# Patient Record
Sex: MALE | Race: OTHER | HISPANIC OR LATINO | Employment: UNEMPLOYED | URBAN - METROPOLITAN AREA
[De-identification: names, ages, dates, MRNs, and addresses within clinical notes are randomized per-mention and may not be internally consistent; named-entity substitution may affect disease eponyms.]

---

## 2020-12-27 ENCOUNTER — APPOINTMENT (EMERGENCY)
Dept: RADIOLOGY | Facility: HOSPITAL | Age: 30
End: 2020-12-27
Payer: COMMERCIAL

## 2020-12-27 ENCOUNTER — HOSPITAL ENCOUNTER (EMERGENCY)
Facility: HOSPITAL | Age: 30
Discharge: ANOTHER HOSPITAL | End: 2020-12-27
Attending: EMERGENCY MEDICINE
Payer: COMMERCIAL

## 2020-12-27 VITALS
HEART RATE: 108 BPM | TEMPERATURE: 97 F | SYSTOLIC BLOOD PRESSURE: 142 MMHG | OXYGEN SATURATION: 100 % | DIASTOLIC BLOOD PRESSURE: 80 MMHG | WEIGHT: 209.22 LBS | RESPIRATION RATE: 18 BRPM

## 2020-12-27 DIAGNOSIS — S48.922A: Primary | ICD-10-CM

## 2020-12-27 LAB
ABO GROUP BLD: NORMAL
ANION GAP SERPL CALCULATED.3IONS-SCNC: 13 MMOL/L (ref 4–13)
APTT PPP: 24 SECONDS (ref 23–37)
BASOPHILS # BLD AUTO: 0.03 THOUSANDS/ΜL (ref 0–0.1)
BASOPHILS NFR BLD AUTO: 0 % (ref 0–1)
BLD GP AB SCN SERPL QL: NEGATIVE
BUN SERPL-MCNC: 15 MG/DL (ref 5–25)
CALCIUM SERPL-MCNC: 8.7 MG/DL (ref 8.3–10.1)
CHLORIDE SERPL-SCNC: 105 MMOL/L (ref 100–108)
CO2 SERPL-SCNC: 23 MMOL/L (ref 21–32)
CREAT SERPL-MCNC: 1.41 MG/DL (ref 0.6–1.3)
EOSINOPHIL # BLD AUTO: 0.18 THOUSAND/ΜL (ref 0–0.61)
EOSINOPHIL NFR BLD AUTO: 2 % (ref 0–6)
ERYTHROCYTE [DISTWIDTH] IN BLOOD BY AUTOMATED COUNT: 11.7 % (ref 11.6–15.1)
GFR SERPL CREATININE-BSD FRML MDRD: 67 ML/MIN/1.73SQ M
GLUCOSE SERPL-MCNC: 118 MG/DL (ref 65–140)
HCT VFR BLD AUTO: 49.1 % (ref 36.5–49.3)
HGB BLD-MCNC: 16.7 G/DL (ref 12–17)
IMM GRANULOCYTES # BLD AUTO: 0.04 THOUSAND/UL (ref 0–0.2)
IMM GRANULOCYTES NFR BLD AUTO: 1 % (ref 0–2)
INR PPP: 0.93 (ref 0.84–1.19)
LYMPHOCYTES # BLD AUTO: 3.29 THOUSANDS/ΜL (ref 0.6–4.47)
LYMPHOCYTES NFR BLD AUTO: 39 % (ref 14–44)
MCH RBC QN AUTO: 31.9 PG (ref 26.8–34.3)
MCHC RBC AUTO-ENTMCNC: 34 G/DL (ref 31.4–37.4)
MCV RBC AUTO: 94 FL (ref 82–98)
MONOCYTES # BLD AUTO: 0.81 THOUSAND/ΜL (ref 0.17–1.22)
MONOCYTES NFR BLD AUTO: 10 % (ref 4–12)
NEUTROPHILS # BLD AUTO: 3.99 THOUSANDS/ΜL (ref 1.85–7.62)
NEUTS SEG NFR BLD AUTO: 48 % (ref 43–75)
NRBC BLD AUTO-RTO: 0 /100 WBCS
PLATELET # BLD AUTO: 238 THOUSANDS/UL (ref 149–390)
PMV BLD AUTO: 10.8 FL (ref 8.9–12.7)
POTASSIUM SERPL-SCNC: 2.8 MMOL/L (ref 3.5–5.3)
PROTHROMBIN TIME: 12.3 SECONDS (ref 11.6–14.5)
RBC # BLD AUTO: 5.24 MILLION/UL (ref 3.88–5.62)
RH BLD: POSITIVE
SODIUM SERPL-SCNC: 141 MMOL/L (ref 136–145)
SPECIMEN EXPIRATION DATE: NORMAL
WBC # BLD AUTO: 8.34 THOUSAND/UL (ref 4.31–10.16)

## 2020-12-27 PROCEDURE — 99291 CRITICAL CARE FIRST HOUR: CPT | Performed by: EMERGENCY MEDICINE

## 2020-12-27 PROCEDURE — 85610 PROTHROMBIN TIME: CPT | Performed by: EMERGENCY MEDICINE

## 2020-12-27 PROCEDURE — 86850 RBC ANTIBODY SCREEN: CPT | Performed by: EMERGENCY MEDICINE

## 2020-12-27 PROCEDURE — 36415 COLL VENOUS BLD VENIPUNCTURE: CPT | Performed by: EMERGENCY MEDICINE

## 2020-12-27 PROCEDURE — 73110 X-RAY EXAM OF WRIST: CPT

## 2020-12-27 PROCEDURE — 71045 X-RAY EXAM CHEST 1 VIEW: CPT

## 2020-12-27 PROCEDURE — 85730 THROMBOPLASTIN TIME PARTIAL: CPT | Performed by: EMERGENCY MEDICINE

## 2020-12-27 PROCEDURE — 80048 BASIC METABOLIC PNL TOTAL CA: CPT | Performed by: EMERGENCY MEDICINE

## 2020-12-27 PROCEDURE — 96360 HYDRATION IV INFUSION INIT: CPT

## 2020-12-27 PROCEDURE — 90471 IMMUNIZATION ADMIN: CPT

## 2020-12-27 PROCEDURE — 86900 BLOOD TYPING SEROLOGIC ABO: CPT | Performed by: EMERGENCY MEDICINE

## 2020-12-27 PROCEDURE — 90715 TDAP VACCINE 7 YRS/> IM: CPT | Performed by: EMERGENCY MEDICINE

## 2020-12-27 PROCEDURE — 99291 CRITICAL CARE FIRST HOUR: CPT

## 2020-12-27 PROCEDURE — 86901 BLOOD TYPING SEROLOGIC RH(D): CPT | Performed by: EMERGENCY MEDICINE

## 2020-12-27 PROCEDURE — 85025 COMPLETE CBC W/AUTO DIFF WBC: CPT | Performed by: EMERGENCY MEDICINE

## 2020-12-27 RX ADMIN — TETANUS TOXOID, REDUCED DIPHTHERIA TOXOID AND ACELLULAR PERTUSSIS VACCINE, ADSORBED 0.5 ML: 5; 2.5; 8; 8; 2.5 SUSPENSION INTRAMUSCULAR at 22:43

## 2020-12-27 RX ADMIN — SODIUM CHLORIDE 1000 ML: 0.9 INJECTION, SOLUTION INTRAVENOUS at 22:17

## 2021-02-22 ENCOUNTER — TRANSCRIBE ORDERS (OUTPATIENT)
Dept: PHYSICAL THERAPY | Facility: MEDICAL CENTER | Age: 31
End: 2021-02-22

## 2021-02-22 ENCOUNTER — EVALUATION (OUTPATIENT)
Dept: PHYSICAL THERAPY | Facility: MEDICAL CENTER | Age: 31
End: 2021-02-22
Payer: COMMERCIAL

## 2021-02-22 DIAGNOSIS — S61.512D LACERATION OF LEFT WRIST, SUBSEQUENT ENCOUNTER: ICD-10-CM

## 2021-02-22 DIAGNOSIS — Z47.89 AFTERCARE FOLLOWING SURGERY OF THE MUSCULOSKELETAL SYSTEM, NEC: Primary | ICD-10-CM

## 2021-02-22 PROCEDURE — 97161 PT EVAL LOW COMPLEX 20 MIN: CPT
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
## 2021-02-25 ENCOUNTER — OFFICE VISIT (OUTPATIENT)
Dept: PHYSICAL THERAPY | Facility: MEDICAL CENTER | Age: 31
End: 2021-02-25
Payer: COMMERCIAL

## 2021-02-25 DIAGNOSIS — S61.512D LACERATION OF LEFT WRIST, SUBSEQUENT ENCOUNTER: ICD-10-CM

## 2021-02-25 DIAGNOSIS — Z47.89 AFTERCARE FOLLOWING SURGERY OF THE MUSCULOSKELETAL SYSTEM, NEC: Primary | ICD-10-CM

## 2021-02-25 PROCEDURE — 97140 MANUAL THERAPY 1/> REGIONS: CPT

## 2021-02-25 NOTE — PROGRESS NOTES
Daily Note     Today's date: 2021  Patient name: Edwin Mejias  : 1990  MRN: [id number]  Referring provider: Marietta Jurado MD  Dx:   Encounter Diagnosis     ICD-10-CM    1  Aftercare following surgery of the musculoskeletal system, NEC  Z47 89    2  Laceration of left wrist, subsequent encounter  S66 112D                   Subjective: Pt reports he was able to go through his exercise program without issues  See Dr Jennifer Jules  Objective: See treatment diary below      Assessment: Tolerated treatment well  Patient exhibited good technique with therapeutic exercises and would benefit from continued PT   Initiated program, pt unable to perform gentle grasping with towel and cones  Able to move digits some with tissue bunching  Reviewed tenodesis and place-hold together  Pt able to actively perform some tendon gliding moving out of resting flexion into slight extension ( in tenodesis); only able to hold flexion contraction with place- hold; Poor differential gliding among FDP/FDS, isolated contraction is fair  Continue to work on activation of flexors as well as differential gliding with blocking exercises  Plan: Continue per plan of care        Precautions: DOS 20  L FPL, FDS/FDP (zone 4), ECU, med/ulanr nerve allograft, CTR and guyon release  HEP: scar massage, wrist PROM (digits relaxed), wrist tenodesis, gentle long flexor stretch individually, blocking, thumb opp    Manuals             Scar/retro massage 5            AA digits, thumb 15            PROM wrist ( digits protected) 10            Place-hold 5            Neuro Re-Ed 35            Wrist widget                                                                                           Ther Ex             Towel bunches Tissue 2 min             wrist ball roll 3 min            cones grasping             Pegs grasping                                                                 Ther Activity Gait Training                                       Modalities              10            CP

## 2021-03-02 ENCOUNTER — OFFICE VISIT (OUTPATIENT)
Dept: PHYSICAL THERAPY | Facility: MEDICAL CENTER | Age: 31
End: 2021-03-02
Payer: COMMERCIAL

## 2021-03-02 DIAGNOSIS — S61.512D LACERATION OF LEFT WRIST, SUBSEQUENT ENCOUNTER: ICD-10-CM

## 2021-03-02 DIAGNOSIS — Z47.89 AFTERCARE FOLLOWING SURGERY OF THE MUSCULOSKELETAL SYSTEM, NEC: Primary | ICD-10-CM

## 2021-03-02 PROCEDURE — 97140 MANUAL THERAPY 1/> REGIONS: CPT

## 2021-03-02 NOTE — PROGRESS NOTES
Daily Note     Today's date: 3/2/2021  Patient name: Taty Alfonso  : 1990  MRN: [id number]  Referring provider: Janae Grullon MD  Dx:   Encounter Diagnosis     ICD-10-CM    1  Aftercare following surgery of the musculoskeletal system, NEC  Z47 89    2  Laceration of left wrist, subsequent encounter  S64 990D                   Subjective: Pt reports he has better motion with massage to his scar and his hand  Still having trouble with active motion especially in his thumb flexion      Objective: See treatment diary below      Assessment: Tolerated treatment well  Patient exhibited good technique with therapeutic exercises and would benefit from continued PT  Pt has better performance with place-hold vs  Active comp flexion or extension of digits  Advised pt to try place-holds on his own for comp flexion and table top; continue with blocking exercises  Plan: Continue per plan of care        Precautions: DOS 20  L FPL, FDS/FDP (zone 4), ECU, med/ulanr nerve allograft, CTR and guyon release  HEP: scar massage, wrist PROM (digits relaxed), wrist tenodesis, gentle long flexor stretch individually, blocking, thumb opp    Manuals 2/25 3/2           Scar/retro massage 5 5           AA digits, thumb 15 15           PROM wrist ( digits protected) 10 5           Place-hold 5 5           Neuro Re-Ed 35 30           Wrist widget                                                                                           Ther Ex             Towel bunches Tissue 2 min             wrist ball roll 3 min            cones grasping             Pegs grasping                                                                 Ther Activity                                       Gait Training                                       Modalities              10 10           CP

## 2021-03-04 ENCOUNTER — OFFICE VISIT (OUTPATIENT)
Dept: PHYSICAL THERAPY | Facility: MEDICAL CENTER | Age: 31
End: 2021-03-04
Payer: COMMERCIAL

## 2021-03-04 DIAGNOSIS — Z47.89 AFTERCARE FOLLOWING SURGERY OF THE MUSCULOSKELETAL SYSTEM, NEC: Primary | ICD-10-CM

## 2021-03-04 DIAGNOSIS — S61.512D LACERATION OF LEFT WRIST, SUBSEQUENT ENCOUNTER: ICD-10-CM

## 2021-03-04 PROCEDURE — 97140 MANUAL THERAPY 1/> REGIONS: CPT

## 2021-03-04 NOTE — PROGRESS NOTES
Daily Note     Today's date: 3/4/2021  Patient name: Kylee Garcia  : 1990  MRN: [id number]  Referring provider: Angie Jones MD  Dx:   Encounter Diagnosis     ICD-10-CM    1  Aftercare following surgery of the musculoskeletal system, NEC  Z47 89    2  Laceration of left wrist, subsequent encounter  S61 677D                   Subjective: Pt feels he is able to bend his fingers a little bit more but still very limited  Objective: See treatment diary below      Assessment: Tolerated treatment well  Patient exhibited good technique with therapeutic exercises and would benefit from continued PT  Added NMES to program, pt had some discomfort but slight improvement in his digit flexion  Also added foams grasping and stacking for home to work on active flexion/grasping, pt able to demonstrate    Plan: Continue per plan of care        Precautions: DOS 20  L FPL, FDS/FDP (zone 4), ECU, med/ulanr nerve allograft, CTR and guyon release  HEP: scar massage, wrist PROM (digits relaxed), wrist tenodesis, gentle long flexor stretch individually, blocking, thumb opp    Manuals  3/2 3/4          Scar/retro massage 5 5 5          AA digits, thumb 15 15 10          PROM wrist ( digits protected) 10 5 5          Place-hold 5 5 5          Neuro Re-Ed 35 30 25          Wrist widget                                                                                           Ther Ex             Towel bunches Tissue 2 min             wrist ball roll 3 min            cones grasping             Pegs grasping                                                                 Ther Activity                                       Gait Training                                       Modalities             MH 10 10 10             NMES 10          CP

## 2021-03-09 ENCOUNTER — APPOINTMENT (OUTPATIENT)
Dept: PHYSICAL THERAPY | Facility: MEDICAL CENTER | Age: 31
End: 2021-03-09
Payer: COMMERCIAL

## 2021-03-09 ENCOUNTER — OFFICE VISIT (OUTPATIENT)
Dept: PHYSICAL THERAPY | Facility: MEDICAL CENTER | Age: 31
End: 2021-03-09
Payer: COMMERCIAL

## 2021-03-09 DIAGNOSIS — Z47.89 AFTERCARE FOLLOWING SURGERY OF THE MUSCULOSKELETAL SYSTEM, NEC: Primary | ICD-10-CM

## 2021-03-09 DIAGNOSIS — S61.512D LACERATION OF LEFT WRIST, SUBSEQUENT ENCOUNTER: ICD-10-CM

## 2021-03-09 PROCEDURE — 97140 MANUAL THERAPY 1/> REGIONS: CPT

## 2021-03-09 NOTE — PROGRESS NOTES
Daily Note     Today's date: 3/9/2021  Patient name: Kylee Garcia  : 1990  MRN: [id number]  Referring provider: Angie Jones MD  Dx:   Encounter Diagnosis     ICD-10-CM    1  Aftercare following surgery of the musculoskeletal system, NEC  Z47 89    2  Laceration of left wrist, subsequent encounter  S69 760D                   Subjective: Pt reports he still is struggling with his active motion of digits  No pain with stretching  Scar still sensitive      Objective: See treatment diary below      Assessment: Tolerated treatment well  Patient exhibited good technique with therapeutic exercises and would benefit from continued PT  Minimal glide with NMES  Improved wrist extension to about 45-50 degrees yet no change in composite flexion of digit even with place-hold  Improved extension to radial digits but ulnar digits are still significantly tight, possibly from scar  Still able to isolate FDP for each digit but weak  Provided Pt with elastomer for scar to wear at night  Continue to work on passive ROM and active flex/extension of digits    Plan: Continue per plan of care        Precautions: DOS 20  L FPL, FDS/FDP (zone 4), ECU, med/ulnar nerve allograft, CTR and guyon release  HEP: scar massage, wrist PROM (digits relaxed), wrist tenodesis, gentle long flexor stretch individually, blocking, thumb opp    Manuals 2/25 3/2 3/4 3/9         Scar/retro massage 5 5 5 5         AA digits, thumb 15 15 10 10         PROM wrist ( digits protected) 10 5 5 5         Place-hold 5 5 5 elastomer 5         Neuro Re-Ed 35 30 25 25         Wrist widget                                                                                           Ther Ex             Towel bunches Tissue 2 min             wrist ball roll 3 min            cones grasping             Pegs grasping                                                                 Ther Activity                                       Gait Training Modalities              10 10 10 10            NMES 10 NMES 10         CP

## 2021-03-11 ENCOUNTER — OFFICE VISIT (OUTPATIENT)
Dept: PHYSICAL THERAPY | Facility: MEDICAL CENTER | Age: 31
End: 2021-03-11
Payer: COMMERCIAL

## 2021-03-11 DIAGNOSIS — Z47.89 AFTERCARE FOLLOWING SURGERY OF THE MUSCULOSKELETAL SYSTEM, NEC: Primary | ICD-10-CM

## 2021-03-11 DIAGNOSIS — S61.512D LACERATION OF LEFT WRIST, SUBSEQUENT ENCOUNTER: ICD-10-CM

## 2021-03-11 PROCEDURE — 97140 MANUAL THERAPY 1/> REGIONS: CPT

## 2021-03-11 NOTE — PROGRESS NOTES
Daily Note     Today's date: 3/11/2021  Patient name: Da Ivey  : 1990  MRN: [id number]  Referring provider: Javid Mai MD  Dx:   Encounter Diagnosis     ICD-10-CM    1  Aftercare following surgery of the musculoskeletal system, NEC  Z47 89    2  Laceration of left wrist, subsequent encounter  S61 087D                   Subjective: Pt reports no changes in his movement      Objective: See treatment diary below      Assessment: Tolerated treatment well  Patient exhibited good technique with therapeutic exercises and would benefit from continued PT   Discussed with pt on fabricating custom static progressive splint to achieve extension- pt could wear for 1 hour 4x/day- awaiting permission from surgeon  Worked on extensors, ulnar digits still most limited  Still able to isolate FDP in each digit but weak  Plan: Continue per plan of care        Precautions: DOS 20  L FPL, FDS/FDP (zone 4), ECU, med/ulnar nerve allograft, CTR and guyon release  HEP: scar massage, wrist PROM (digits relaxed), wrist tenodesis, gentle long flexor stretch individually, blocking, thumb opp    Manuals  3 3/4 3/9 3/11        Scar/retro massage 5 5 5 5 5        AA digits, thumb 15 15 10 10 15        PROM wrist ( digits protected) 10 5 5 5 5        Place-hold 5 5 5 elastomer 5 10        Neuro Re-Ed 35 30 25 25 45        Wrist widget                                                                                           Ther Ex             Towel bunches Tissue 2 min             wrist ball roll 3 min            cones grasping             Pegs grasping                                                                 Ther Activity                                       Gait Training                                       Modalities             MH 10 10 10 10            NMES 10 NMES 10         CP

## 2021-03-16 ENCOUNTER — OFFICE VISIT (OUTPATIENT)
Dept: PHYSICAL THERAPY | Facility: MEDICAL CENTER | Age: 31
End: 2021-03-16
Payer: COMMERCIAL

## 2021-03-16 DIAGNOSIS — Z47.89 AFTERCARE FOLLOWING SURGERY OF THE MUSCULOSKELETAL SYSTEM, NEC: Primary | ICD-10-CM

## 2021-03-16 DIAGNOSIS — S61.512D LACERATION OF LEFT WRIST, SUBSEQUENT ENCOUNTER: ICD-10-CM

## 2021-03-16 PROCEDURE — 97140 MANUAL THERAPY 1/> REGIONS: CPT

## 2021-03-16 NOTE — PROGRESS NOTES
Daily Note     Today's date: 3/16/2021  Patient name: Meg Barksdale  : 1990  MRN: [id number]  Referring provider: Mandy Glover MD  Dx:   Encounter Diagnosis     ICD-10-CM    1  Aftercare following surgery of the musculoskeletal system, NEC  Z47 89    2  Laceration of left wrist, subsequent encounter  S63 013D                   Subjective: pt reports he has been working on his hand regualrly but still not change in his active movement  Objective: See treatment diary below      Assessment: Tolerated treatment well  Patient exhibited good technique with therapeutic exercises and would benefit from continued PT   Still have not heard back from Dr Ekaterina Malone or his assistant if static progressive would be approved ( L/M on Wednesday 3/10 and emailed Dr Ekaterina Malone on Thursday 3/11)  Pt independently performing gentle stretching on his own  Pt has improved mobility in his digits when moving into extension; no significant change in wrist position, most likely tension from scar  Pt noted pain into his palm  Added US to scar and performed IASTM with gentle stretch- pt still sensitive along median of scar  No pain with gentle stretch to flexors  Place- holds still unsuccessful yet improved independent glide and differential glide to FDP and FDS  Pt also has improved active extension moving into place-hold table top and gradual extension to MCP  Fabricated custom extension splint with MPs in 30 degrees, wrist neutral, pt able to don/doff  Provided pt with relative motion splint to wear during the day- RF and SF in relative flexion- pt was able to grasp and hold cones, after about 10 cones, pt's digits fatigued  Pt seemed pleased he could use his hand for gentle grasping in general  Instructed him on wearing relative motion during the day  Plan: Continue per plan of care        Precautions: DOS 20  L FPL, FDS/FDP (zone 4), ECU, med/ulnar nerve allograft, CTR and guyon release  HEP: scar massage, wrist PROM (digits relaxed), wrist tenodesis, gentle long flexor stretch individually, blocking, thumb opp    Manuals 2/25 3/2 3/4 3/9 3/11 3/16       Scar/retro massage 5 5 5 5 5 10       AA digits, thumb 15 15 10 10 15 15       PROM wrist ( digits protected) 10 5 5 5 5        Place-hold 5 5 5 elastomer 5 10 relative motion       Neuro Re-Ed 35 30 25 25 45 50       Wrist widget                                                                                           Ther Ex             Towel bunches Tissue 2 min             wrist ball roll 3 min            cones grasping             Pegs grasping                                                                 Ther Activity                                       Gait Training                                       Modalities             MH 10 10 10 10  10          NMES 10 NMES 10         CP

## 2021-03-18 ENCOUNTER — OFFICE VISIT (OUTPATIENT)
Dept: PHYSICAL THERAPY | Facility: MEDICAL CENTER | Age: 31
End: 2021-03-18
Payer: COMMERCIAL

## 2021-03-18 DIAGNOSIS — Z47.89 AFTERCARE FOLLOWING SURGERY OF THE MUSCULOSKELETAL SYSTEM, NEC: Primary | ICD-10-CM

## 2021-03-18 DIAGNOSIS — S61.512D LACERATION OF LEFT WRIST, SUBSEQUENT ENCOUNTER: ICD-10-CM

## 2021-03-18 PROCEDURE — 97140 MANUAL THERAPY 1/> REGIONS: CPT

## 2021-03-18 NOTE — PROGRESS NOTES
Daily Note     Today's date: 3/18/2021  Patient name: Edwin Mejias  : 1990  MRN: [id number]  Referring provider: Marietta Jurado MD  Dx:   Encounter Diagnosis     ICD-10-CM    1  Aftercare following surgery of the musculoskeletal system, NEC  Z47 89    2  Laceration of left wrist, subsequent encounter  S68 765D                   Subjective: Pt reports he likes the relative motion splint, wearing it all day  Feels like the tendon are moving better expect small finger      Objective: See treatment diary below      Assessment: Tolerated treatment well  Patient exhibited good technique with therapeutic exercises and would benefit from continued PT   Increased time on US - 50% with incorporated stretch for flexors  Fabricated exercise splint for blocking, pt instructed on use for each  Pt able to grasp cones without relative motion splint for 3 minutes, less fatigue      Plan: Continue per plan of care        Precautions: DOS 20  L FPL, FDS/FDP (zone 4), ECU, med/ulnar nerve allograft, CTR and guyon release  HEP: scar massage, wrist PROM (digits relaxed), wrist tenodesis, gentle long flexor stretch individually, blocking, thumb opp    Manuals 2/25 3/2 3/4 3/9 3/11 3/16 3/18      Scar/retro massage 5 5 5 5 5 10 10      AA digits, thumb 15 15 10 10 15 15 10      PROM wrist ( digits protected) 10 5 5 5 5        Place-hold 5 5 5 elastomer 5 10 relative motion 10 US with stretch      Neuro Re-Ed 35 30 25 25 45 50 30      Wrist widget                                                                                           Ther Ex             Towel bunches Tissue 2 min             wrist ball roll 3 min            cones grasping       3 min      Pegs grasping                                                                 Ther Activity                                       Gait Training                                       Modalities             MH 10 10 10 10  10 10         NMES 10 NMES 10  US 5       CP

## 2021-03-23 ENCOUNTER — OFFICE VISIT (OUTPATIENT)
Dept: PHYSICAL THERAPY | Facility: MEDICAL CENTER | Age: 31
End: 2021-03-23
Payer: COMMERCIAL

## 2021-03-23 DIAGNOSIS — Z47.89 AFTERCARE FOLLOWING SURGERY OF THE MUSCULOSKELETAL SYSTEM, NEC: Primary | ICD-10-CM

## 2021-03-23 DIAGNOSIS — S61.512D LACERATION OF LEFT WRIST, SUBSEQUENT ENCOUNTER: ICD-10-CM

## 2021-03-23 PROCEDURE — 97140 MANUAL THERAPY 1/> REGIONS: CPT

## 2021-03-23 NOTE — PROGRESS NOTES
Daily Note     Today's date: 3/23/2021  Patient name: Sandoval Pacheco  : 1990  MRN: [id number]  Referring provider: Elio Mallory MD  Dx:   Encounter Diagnosis     ICD-10-CM    1  Aftercare following surgery of the musculoskeletal system, NEC  Z47 89    2  Laceration of left wrist, subsequent encounter  S62 364D                   Subjective: Pt reports he feels the scar is really holding him back he is working on hard on bending his fingers      Objective: See treatment diary below      Assessment: Tolerated treatment well  Patient exhibited good technique with therapeutic exercises and would benefit from continued PT  Pt had improved terminal extension to all digits with MCP's held in about 20 degrees flexion, once they collapse into hyperextension only a hook fist can be formed  His IF was lagging in comp flexion place hold  and active grasping activities  When IF was isolated he had good independent FDP and FDS glide yet only fair differential glide  He was unable to form full comp flexion of his IF even with assistance  Pt described himself working hard to get tendons to move but he felt scar was main limitation to his lack of motion  Plan: Continue per plan of care        Precautions: DOS 20  L FPL, FDS/FDP (zone 4), ECU, med/ulnar nerve allograft, CTR and guyon release  HEP: scar massage, wrist PROM (digits relaxed), wrist tenodesis, gentle long flexor stretch individually, blocking, thumb opp    Manuals  3 3/4 3/9 3/11 3/16 3/18 3/23     Scar/retro massage 5 5 5 5 5 10 10 10     AA digits, thumb 15 15 10 10 15 15 10 10     PROM wrist ( digits protected) 10 5 5 5 5        Place-hold 5 5 5 elastomer 5 10 relative motion 10 US with stretch 10     Neuro Re-Ed 35 30 25 25 45 50 30 30     Wrist widget                                                                                           Ther Ex             Towel bunches Tissue 2 min       3 min      wrist ball roll 3 min cones grasping       3 min 3 min     Pegs grasping                                                                 Ther Activity                                       Gait Training                                       Modalities             MH 10 10 10 10  10 10 10        NMES 10 NMES 10  US 5       CP

## 2021-03-25 ENCOUNTER — OFFICE VISIT (OUTPATIENT)
Dept: PHYSICAL THERAPY | Facility: MEDICAL CENTER | Age: 31
End: 2021-03-25
Payer: COMMERCIAL

## 2021-03-25 DIAGNOSIS — S61.512D LACERATION OF LEFT WRIST, SUBSEQUENT ENCOUNTER: ICD-10-CM

## 2021-03-25 DIAGNOSIS — Z47.89 AFTERCARE FOLLOWING SURGERY OF THE MUSCULOSKELETAL SYSTEM, NEC: Primary | ICD-10-CM

## 2021-03-25 PROCEDURE — 97140 MANUAL THERAPY 1/> REGIONS: CPT

## 2021-03-25 NOTE — PROGRESS NOTES
Daily Note     Today's date: 3/25/2021  Patient name: Da Ivey  : 1990  MRN: [id number]  Referring provider: Javid Mai MD  Dx:   Encounter Diagnosis     ICD-10-CM    1  Aftercare following surgery of the musculoskeletal system, NEC  Z47 89    2  Laceration of left wrist, subsequent encounter  S68 316D                   Subjective: Pt reports a little bit of pain trying to actively move digits after aggressive IASTM      Objective: See treatment diary below      Assessment: Tolerated treatment well  Patient exhibited good technique with therapeutic exercises and would benefit from continued PT  Performed US and aggressive IASTM to scar  Pt still has extension lag to his digits with greater extension than 20 at his MCPs'  Flexion lag at his IF  After manuals and TE's, able to perform about about 45-50 degrees flexion at his MCP's ni composite flexion which he could not do previous week  Encouraged pt to continue to work on his flexion, extension, and blocking for even better glide of his FDP and FDS    Plan: Continue per plan of care        Precautions: DOS 20  L FPL, FDS/FDP (zone 4), ECU, med/ulnar nerve allograft, CTR and guyon release  HEP: scar massage, wrist PROM (digits relaxed), wrist tenodesis, gentle long flexor stretch individually, blocking, thumb opp    Manuals 2/25 3/2 3/4 3/9 3/11 3/16 3/18 3/23 3/25    Scar/retro massage 5 5 5 5 5 10 10 10 10    AA digits, thumb 15 15 10 10 15 15 10 10 10    PROM wrist ( digits protected) 10 5 5 5 5        Place-hold 5 5 5 elastomer 5 10 relative motion 10 US with stretch 10 10    Neuro Re-Ed 35 30 25 25 45 50 30 30 30    Wrist widget                                                                                           Ther Ex             Towel bunches Tissue 2 min       3 min 3 min     wrist ball roll 3 min        Wrist AROM 20x each    cones grasping       3 min 3 min 3 min    Pegs grasping             fingerweb digit flexion yellow 20x                                       10    Ther Activity                                       Gait Training                                       Modalities             MH 10 10 10 10  10 10 10 10       NMES 10 NMES 10  US 5       CP

## 2021-03-30 ENCOUNTER — OFFICE VISIT (OUTPATIENT)
Dept: PHYSICAL THERAPY | Facility: MEDICAL CENTER | Age: 31
End: 2021-03-30
Payer: COMMERCIAL

## 2021-03-30 DIAGNOSIS — Z47.89 AFTERCARE FOLLOWING SURGERY OF THE MUSCULOSKELETAL SYSTEM, NEC: Primary | ICD-10-CM

## 2021-03-30 DIAGNOSIS — S61.512D LACERATION OF LEFT WRIST, SUBSEQUENT ENCOUNTER: ICD-10-CM

## 2021-03-30 PROCEDURE — 97140 MANUAL THERAPY 1/> REGIONS: CPT

## 2021-03-30 NOTE — PROGRESS NOTES
Daily Note     Today's date: 3/30/2021  Patient name: Meg Barksdale  : 1990  MRN: [id number]  Referring provider: Mandy Glover MD  Dx:   Encounter Diagnosis     ICD-10-CM    1  Aftercare following surgery of the musculoskeletal system, NEC  Z47 89    2  Laceration of left wrist, subsequent encounter  S63 418D                   Subjective: Pt reports he accidentally broke his relative motion splint  He is starting to get back sensation to his ulnar hand  Objective: See treatment diary below      Assessment: Tolerated treatment well  Patient exhibited good technique with therapeutic exercises and would benefit from continued PT  Pt's flexion has improved by about 25%  Believe scar adhesions are deeper than epidermal layer- flexors are still adhered, visual with passive stretch along incision crease (MF midline, RF and SF just ulnar to midline)  Pt able to extend each digits fully with MCP blocked to 15-20 degrees  Once Pt falls into hyperextension a this MCPs he form a claw hand more significantly at his ulnar digits  May possibly try an anticlaw splint in place of relative motion splint    Plan: Continue per plan of care        Precautions: DOS 20  L FPL, FDS/FDP (zone 4), ECU, med/ulnar nerve allograft, CTR and guyon release  HEP: scar massage, wrist PROM (digits relaxed), wrist tenodesis, gentle long flexor stretch individually, blocking, thumb opp    Manuals  3 3/4 3/9 3/11 3/16 3/18 3/23 3/25 3/30   Scar/retro massage 5 5 5 5 5 10 10 10 10 10   AA digits, thumb 15 15 10 10 15 15 10 10 10 10   PROM wrist ( digits protected) 10 5 5 5 5        Place-hold 5 5 5 elastomer 5 10 relative motion 10 US with stretch 10 10 10   Neuro Re-Ed 35 30 25 25 45 50 30 30 30 30   Wrist widget                                                                                           Ther Ex             Towel bunches Tissue 2 min       3 min 3 min     wrist ball roll 3 min        Wrist AROM 20x each 20x each   cones grasping       3 min 3 min 3 min 3 min   Pegs grasping             fingerweb digit flexion         yellow 20x yellow 20x                                      10 10   Ther Activity                                       Gait Training                                       Modalities             MH 10 10 10 10  10 10 10 10 10      NMES 10 NMES 10  US 5    10   CP

## 2021-04-01 ENCOUNTER — OFFICE VISIT (OUTPATIENT)
Dept: PHYSICAL THERAPY | Facility: MEDICAL CENTER | Age: 31
End: 2021-04-01
Payer: COMMERCIAL

## 2021-04-01 DIAGNOSIS — S61.512D LACERATION OF LEFT WRIST, SUBSEQUENT ENCOUNTER: Primary | ICD-10-CM

## 2021-04-01 DIAGNOSIS — Z47.89 AFTERCARE FOLLOWING SURGERY OF THE MUSCULOSKELETAL SYSTEM, NEC: ICD-10-CM

## 2021-04-01 PROCEDURE — 97140 MANUAL THERAPY 1/> REGIONS: CPT

## 2021-04-01 NOTE — PROGRESS NOTES
Daily Note     Today's date: 2021  Patient name: Rodrick Lilly  : 1990  MRN: [id number]  Referring provider: Lena Martin MD  Dx:   Encounter Diagnosis     ICD-10-CM    1  Laceration of left wrist, subsequent encounter  S61 512D    2  Aftercare following surgery of the musculoskeletal system, NEC  Z47 89                   Subjective: Pt reports no change in movement  Feels like SF and RF are still really weak  Objective: See treatment diary below      Assessment: Tolerated treatment well  Patient exhibited good technique with therapeutic exercises and would benefit from continued PT   attempted to make anti-claw orthosis but ran out of time  Added putty for home- emphasized for him to keep trying  To work on DIP flexion into putty, work in thumb flexion into putty and digit add    Plan: Continue per plan of care        Precautions: DOS 20  L FPL, FDS/FDP (zone 4), ECU, med/ulnar nerve allograft, CTR and guyon release  HEP: scar massage, wrist PROM (digits relaxed), wrist tenodesis, gentle long flexor stretch individually, blocking, thumb opp, blocking, putty roll and pinch, putty thumb flexion, didit add    Manuals             US 50% with stretch, AROM 10            IASTM scar massage 5            PROM digits 10            Place-hold extension, flexion, blocking 15             30            Neuro Re-Ed                                                                                                        Ther Ex             Towel bunches 2 min             wrist ball roll 20x each            cones grasping 3 min            Pegs grasping             fingerweb digit flexion Yellow 20x                                       15            Ther Activity                                       Gait Training                                       Modalities             MH 10                         CP

## 2021-04-06 ENCOUNTER — OFFICE VISIT (OUTPATIENT)
Dept: PHYSICAL THERAPY | Facility: MEDICAL CENTER | Age: 31
End: 2021-04-06
Payer: COMMERCIAL

## 2021-04-06 DIAGNOSIS — Z47.89 AFTERCARE FOLLOWING SURGERY OF THE MUSCULOSKELETAL SYSTEM, NEC: ICD-10-CM

## 2021-04-06 DIAGNOSIS — S61.512D LACERATION OF LEFT WRIST, SUBSEQUENT ENCOUNTER: Primary | ICD-10-CM

## 2021-04-06 PROCEDURE — 97140 MANUAL THERAPY 1/> REGIONS: CPT

## 2021-04-06 NOTE — PROGRESS NOTES
Daily Note     Today's date: 2021  Patient name: Sierra Mendiola  : 1990  MRN: [id number]  Referring provider: Dewey Myers MD  Dx:   Encounter Diagnosis     ICD-10-CM    1  Laceration of left wrist, subsequent encounter  S61 512D    2  Aftercare following surgery of the musculoskeletal system, NEC  Z47 89                   Subjective: Pt reports he noticed improved motion at his thumb      Objective: See treatment diary below      Assessment: Tolerated treatment well  Patient exhibited good technique with therapeutic exercises and would benefit from continued PT  Pt has improved activation of his FPL tendon in his thumb  Still some compensatory extension at his [facility], but still able to perform combined IP and MCP flexion when blocked  Pt has improved differential glide of all digits including RF which has been lagging, full active hook fist  Adding MCP flexion tends to cause lag in FDP activation  Pt still able to achieve Ip extension with MPC blocked in about 15-20 degrees flexion  Fabricated anti-claw splint for all digits- pt able to don/doff independently; demonstrated use to Pt, pt had a weaker grasp but let him know that it should improve with  time      Plan: Continue per plan of care        Precautions: DOS 20  L FPL, FDS/FDP (zone 4), ECU, med/ulnar nerve allograft, CTR and guyon release  HEP: scar massage, wrist PROM (digits relaxed), wrist tenodesis, gentle long flexor stretch individually, blocking, thumb opp, blocking, putty roll and pinch, putty thumb flexion, didit add    Manuals            US 50% with stretch, AROM 10 10           IASTM scar massage 5            PROM digits 10 10           Place-hold extension, flexion, blocking 15 15 + splint geri            30 30           Neuro Re-Ed                                                                                                        Ther Ex             Towel bunches 2 min 2 min            wrist ball roll 20x each 20x each           cones grasping 3 min 3 min           Pegs grasping             fingerweb digit flexion Yellow 20x yellow 20x           flexbar towel twist  yellow 20x                         15 15           Ther Activity                                       Gait Training                                       Modalities              10 10                        CP

## 2021-04-08 ENCOUNTER — OFFICE VISIT (OUTPATIENT)
Dept: PHYSICAL THERAPY | Facility: MEDICAL CENTER | Age: 31
End: 2021-04-08
Payer: COMMERCIAL

## 2021-04-08 DIAGNOSIS — Z47.89 AFTERCARE FOLLOWING SURGERY OF THE MUSCULOSKELETAL SYSTEM, NEC: Primary | ICD-10-CM

## 2021-04-08 DIAGNOSIS — S61.512D LACERATION OF LEFT WRIST, SUBSEQUENT ENCOUNTER: ICD-10-CM

## 2021-04-08 PROCEDURE — 97140 MANUAL THERAPY 1/> REGIONS: CPT

## 2021-04-08 NOTE — PROGRESS NOTES
Daily Note     Today's date: 2021  Patient name: Sierra Mendiola  : 1990  MRN: [id number]  Referring provider: Dewey Myers MD  Dx:   Encounter Diagnosis     ICD-10-CM    1  Aftercare following surgery of the musculoskeletal system, NEC  Z47 89    2  Laceration of left wrist, subsequent encounter  S61 634D                   Subjective: pt reports anti-claw splint is working out okay  Has been doing his stretches; Has noticed his feeling is coming back in his thenar eminence but only a little along his hypothenar region      Objective: See treatment diary below      Assessment: Tolerated treatment well  Patient exhibited good technique with therapeutic exercises and would benefit from continued PT   Pt still has severe scar adherence along volar ulnar wrist scar for all his flexors; able to achieve full extension passively, no hyperextension permitted, nondependent on wrist position  Pt has lost some differential glide in his digits since adding anti-claw splint yet he has improved comp flexion with MP blocked in slight flexion  Pt still has good isolated FDP function in each digit  Unable to independently move his digits in intrinisic plus position but able to hold position when placed  Able to achivee full IP extension with 4+/5 strength when MCP blocked at at least 15-20 degrees  Today Pt for the first time was able to oppose his thumb to touch tip of his RF, still weak but significant improvement; no sequential flexion at IP with opposition, IP flexion can only be performed when isolated; CMC also has to be blocked to prevent hyperextension compensaton    Called and left a message with Dr Waleska Bonilla assistant to follow up for an update with the Pt  Plan: Continue per plan of care        Precautions: DOS 20  L FPL, FDS/FDP (zone 4), ECU, med/ulnar nerve allograft, CTR and guyon release  HEP: scar massage, wrist PROM (digits relaxed), wrist tenodesis, gentle long flexor stretch individually, blocking, thumb opp, blocking, putty roll and pinch, putty thumb flexion, didit add    Manuals 4/1 4/6 4/8          US 50% with stretch, AROM 10 10 10          IASTM scar massage 5  5          PROM digits 10 10 10          Place-hold extension, flexion, blocking 15 15 + splint geri 15           30 30 30          Neuro Re-Ed                                                                                                        Ther Ex             Towel bunches 2 min 2 min 3 min           wrist ball roll 20x each 20x each 20x each          cones grasping 3 min 3 min 3 min          Pegs grasping             fingerweb digit flexion Yellow 20x yellow 20x Yellow 30x          flexbar towel twist  yellow 20x                         15 15 15          Ther Activity                                       Gait Training                                       Modalities             MH 10 10 10                       CP

## 2021-04-13 ENCOUNTER — OFFICE VISIT (OUTPATIENT)
Dept: PHYSICAL THERAPY | Facility: MEDICAL CENTER | Age: 31
End: 2021-04-13
Payer: COMMERCIAL

## 2021-04-13 DIAGNOSIS — S61.512D LACERATION OF LEFT WRIST, SUBSEQUENT ENCOUNTER: ICD-10-CM

## 2021-04-13 DIAGNOSIS — Z47.89 AFTERCARE FOLLOWING SURGERY OF THE MUSCULOSKELETAL SYSTEM, NEC: Primary | ICD-10-CM

## 2021-04-13 PROCEDURE — 97140 MANUAL THERAPY 1/> REGIONS: CPT

## 2021-04-13 NOTE — PROGRESS NOTES
Daily Note     Today's date: 2021  Patient name: Cristel Rivas  : 1990  MRN: [id number]  Referring provider: Clara Back MD  Dx:   Encounter Diagnosis     ICD-10-CM    1  Aftercare following surgery of the musculoskeletal system, NEC  Z47 89    2  Laceration of left wrist, subsequent encounter  S67 907D                   Subjective: Pt reports he burned his IF while cooking  Has been doing hsi exercises all day long  Also following up with physician on       Objective: See treatment diary below      Assessment: Tolerated treatment well  Patient exhibited good technique with therapeutic exercises and would benefit from continued PT  Pt had superficial burn along P2 of IF, appeared to be healing, no drainage  Pt had significant improvement in his differential gliding compared to previous visit, improved power with FDP in ulnar digits  Pt also has gradual improvement in opponens contraction- requires blocking at [facility] to prevent hyperextension compensation  Comp flexion stable- mild lag to IF and SF  Digit extension still stable with MCP blocked at 10-15 degrees    Plan: Continue per plan of care  Progress note during next visit        Precautions: DOS 20  L FPL, FDS/FDP (zone 4), ECU, med/ulnar nerve allograft, CTR and guyon release  HEP: scar massage, wrist PROM (digits relaxed), wrist tenodesis, gentle long flexor stretch individually, blocking, thumb opp, blocking, putty roll and pinch, putty thumb flexion, didit add    Manuals  46          US 50% with stretch, AROM 10 10 10 5         IASTM scar massage 5  5 5         PROM digits 10 10 10 5         Place-hold extension, flexion, blocking 15 15 + splint geri 15 5          30 30 30 20         Neuro Re-Ed                                                                                                        Ther Ex             Towel bunches 2 min 2 min 3 min 3 min          wrist ball roll 20x each 20x each 20x each 20x each         cones grasping 3 min 3 min 3 min 3 min         Pegs grasping             fingerweb digit flexion Yellow 20x yellow 20x Yellow 30x Yellow 30x         flexbar towel twist  yellow 20x                         15 15 15 15         Ther Activity                                       Gait Training                                       Modalities              10 10 10 10                      CP

## 2021-04-15 ENCOUNTER — OFFICE VISIT (OUTPATIENT)
Dept: PHYSICAL THERAPY | Facility: MEDICAL CENTER | Age: 31
End: 2021-04-15
Payer: COMMERCIAL

## 2021-04-15 DIAGNOSIS — Z47.89 AFTERCARE FOLLOWING SURGERY OF THE MUSCULOSKELETAL SYSTEM, NEC: Primary | ICD-10-CM

## 2021-04-15 DIAGNOSIS — S61.512D LACERATION OF LEFT WRIST, SUBSEQUENT ENCOUNTER: ICD-10-CM

## 2021-04-15 PROCEDURE — 97140 MANUAL THERAPY 1/> REGIONS: CPT

## 2021-04-15 NOTE — PROGRESS NOTES
Daily Note/Re-evaluation    Today's date: 4/15/2021  Patient name: Deepak Waldron  : 1990  MRN: [id number]  Referring provider: Richard Mcdonald MD  Dx:   Encounter Diagnosis     ICD-10-CM    1  Aftercare following surgery of the musculoskeletal system, NEC  Z47 89    2  Laceration of left wrist, subsequent encounter  S61 943D                   Subjective: Pt reports his sensation is improving  He feels his fingers are getting stronger, his thumb is still very weak  Objective: See treatment diary below      Assessment: Tolerated treatment well   Patient exhibited good technique with therapeutic exercises and would benefit from continued PT  Goals  STG (2-3 weeks)  1: pt independent in HEP- met  2: full closure of wrist crease incision- met  3: improve wrist AROM 10 degrees- met  4: improve FDP/FDS/FPL to about 2+ or 3-: partially met  5: full digit extension with wrist flex to 40- MCPs need to be blocked    LTG (6-8 weeks)  1: Improve FOTO >60/100- not met  2: pt able to perform > 50% of ADL's- not met  3: pt able to form 50-75% comp fist- partially met  4: less than 30 degrees PIP extension in comp extension- MCP's need to be blocked  5: thumb IP flexion to 35-40 degrees- met  6: passive wrist ext to 40-45 degrees- met  7:  strength within 30lbs of uninvolved side- not met    Observation- wrist crease incision has reduced in elevation and scar fibrosis, still some adherence with digit flexors  Fingers rested in claw hand ulnar digits > radial digits    Sensation:Th 0 2g; IF 2 g; MF 4g; RF 200g;  g  Diminished light touch to thenar eminence  decreased protective sensation to hypothenar eminence, pt reports it has improved    AROM  Th- composite fleixon MCP 25/ IP 30  Isolated flexion MCP 55; Isolated IP, CMC blocked 50 degrees  Active th opp to PIP of IF  Active comp flexion  IF MF RF SF  55 55 25 20  70 85 95 85  20 25 30 50  Digits are able to achieve full IP extension with MCP's blocked in at least 10 degrees flexion  PT's SF MCP joint and extrinsic extensors still have some passive tension (2 5 cm from [facility]), possibly due to claw hand posture; other digits have full passive fleixon    Pt has significant improvement in differential gliding of FDP and FDS in his ulnar digits compared to previous week; still some lag in MF DIP when forming hook fist  Active  Wrist ext/flex 45/60  Strength  Wrist ext/flex 4+/4+  ECU 4-/5  FDP radial digits 4/5  FDP ulnar digits 4-/5  Unable to get a read for  strength        Plan: Continue for another 6-8 weeks to further his ROM     Precautions: DOS 12/30/20  L FPL, FDS/FDP (zone 4), ECU, med/ulnar nerve allograft, CTR and guyon release  HEP: scar massage, wrist PROM, wrist tenodesis, gentle long flexor stretch individually, blocking, thumb opp, blocking, putty roll and pinch, putty thumb flexion, digit add, elastomer for scar, night extension orthosis, anti-claw orthosis for day    Manuals 4/1 4/6 4/8 4/13 4/15        US 50% with stretch, AROM 10 10 10 5 NP        IASTM scar massage 5  5 5 5        PROM digits 10 10 10 5 5        Place-hold extension, flexion, blocking 15 15 + splint geri 15 5 5 + measurements         30 30 30 20 25        Neuro Re-Ed                                                                                                        Ther Ex             Towel bunches 2 min 2 min 3 min 3 min 3 min         wrist ball roll 20x each 20x each 20x each 20x each         cones grasping 3 min 3 min 3 min 3 min         Pegs grasping             fingerweb digit flexion Yellow 20x yellow 20x Yellow 30x Yellow 30x yellow 30x        flexbar towel twist  yellow 20x   yellow 20x        rubberband extension    2 rounds 2 rounds         15 15 15 15 10        Ther Activity                                       Gait Training                                       Modalities             MH 10 10 10 10 10                     CP

## 2021-04-20 ENCOUNTER — OFFICE VISIT (OUTPATIENT)
Dept: PHYSICAL THERAPY | Facility: MEDICAL CENTER | Age: 31
End: 2021-04-20
Payer: COMMERCIAL

## 2021-04-20 DIAGNOSIS — S61.512D LACERATION OF LEFT WRIST, SUBSEQUENT ENCOUNTER: ICD-10-CM

## 2021-04-20 DIAGNOSIS — Z47.89 AFTERCARE FOLLOWING SURGERY OF THE MUSCULOSKELETAL SYSTEM, NEC: Primary | ICD-10-CM

## 2021-04-20 PROCEDURE — 97140 MANUAL THERAPY 1/> REGIONS: CPT

## 2021-04-20 NOTE — PROGRESS NOTES
Daily Note     Today's date: 2021  Patient name: Dex Garcia  : 1990  MRN: [id number]  Referring provider: Magdaleno Pardo MD  Dx:   Encounter Diagnosis     ICD-10-CM    1  Aftercare following surgery of the musculoskeletal system, NEC  Z47 89    2  Laceration of left wrist, subsequent encounter  S68 891D                   Subjective: Pt reports he saw Dr happy with progress, said he could contact our office to discuss his therapy treatment  Objective: See treatment diary below      Assessment: Tolerated treatment well  Patient exhibited good technique with therapeutic exercises and would benefit from continued PT  Pt had improved active motion to his IF, still has MCP stiffness and extrinsic extensor tightness in SF so still limiting composite flexion  provided pt with TB for home to work on wrist strengthening without demanding full flexion holding onto a weight  Added apprehension with pegs, pt had some success but struggled some with push pegs back into each hole while keeping his grasp    Plan: Continue per plan of care        Precautions: DOS 20  L FPL, FDS/FDP (zone 4), ECU, med/ulnar nerve allograft, CTR and guyon release  HEP: scar massage, wrist PROM, wrist tenodesis, gentle long flexor stretch individually, blocking, thumb opp, blocking, putty roll and pinch, putty thumb flexion, digit add, elastomer for scar, night extension orthosis, anti-claw orthosis for day    Manuals 4/1 4/6 4/8 4/13 4/15 4/20       US 50% with stretch, AROM 10 10 10 5 NP        IASTM scar massage 5  5 5 5 5       PROM digits 10 10 10 5 5 5       Place-hold extension, flexion, blocking 15 15 + splint geri 15 5 5 + measurements 10        30 30 30 20 25 20       Neuro Re-Ed                                                                                                        Ther Ex             Towel bunches 2 min 2 min 3 min 3 min 3 min 3 min        wrist ball roll 20x each 20x each 20x each 20x each wrist curls RTB 2x10 each       cones grasping 3 min 3 min 3 min 3 min  3min       Pegs grasping             fingerweb digit flexion Yellow 20x yellow 20x Yellow 30x Yellow 30x yellow 30x yellow 30x       flexbar towel twist  yellow 20x   yellow 20x        rubberband extension    2 rounds 2 rounds 2 rounds        15 15 15 15 10        Ther Activity                                       Gait Training                                       Modalities             MH 10 10 10 10 10 10                    CP

## 2021-04-22 ENCOUNTER — OFFICE VISIT (OUTPATIENT)
Dept: PHYSICAL THERAPY | Facility: MEDICAL CENTER | Age: 31
End: 2021-04-22
Payer: COMMERCIAL

## 2021-04-22 DIAGNOSIS — S61.512D LACERATION OF LEFT WRIST, SUBSEQUENT ENCOUNTER: ICD-10-CM

## 2021-04-22 DIAGNOSIS — Z47.89 AFTERCARE FOLLOWING SURGERY OF THE MUSCULOSKELETAL SYSTEM, NEC: Primary | ICD-10-CM

## 2021-04-22 PROCEDURE — 97140 MANUAL THERAPY 1/> REGIONS: CPT

## 2021-04-22 NOTE — PROGRESS NOTES
Daily Note     Today's date: 2021  Patient name: Angela Lazo  : 1990  MRN: [id number]  Referring provider: Fadumo Byers MD  Dx:   Encounter Diagnosis     ICD-10-CM    1  Aftercare following surgery of the musculoskeletal system, NEC  Z47 89    2  Laceration of left wrist, subsequent encounter  S64 589D                   Subjective: Pt reports he needs more appts      Objective: See treatment diary below      Assessment: Tolerated treatment well  Patient exhibited good technique with therapeutic exercises and would benefit from continued PT  Pt has improved ability in making a straight fist position with more FDS functioning, yet SF still lagging in FDS and lumbrical contracture, stays in hook fist position  Still has severe weakness in thumb opp - yet able to touch tip of MF  Continue to work on his active motion and strengthening where he is able  Plan: Continue per plan of care        Precautions: DOS 20  L FPL, FDS/FDP (zone 4), ECU, med/ulnar nerve allograft, CTR and guyon release  HEP: scar massage, wrist PROM, wrist tenodesis, gentle long flexor stretch individually, blocking, thumb opp, blocking, putty roll and pinch, putty thumb flexion, digit add, elastomer for scar, night extension orthosis, anti-claw orthosis for day    Manuals 4/1 4/6 4/8 4/13 4/15 4/20 4/22      US 50% with stretch, AROM 10 10 10 5 NP        IASTM scar massage 5  5 5 5 5 5      PROM digits 10 10 10 5 5 5 10      Place-hold extension, flexion, blocking 15 15 + splint geri 15 5 5 + measurements 10 10       30 30 30 20 25 20 25      Neuro Re-Ed                                                                                                        Ther Ex             Towel bunches 2 min 2 min 3 min 3 min 3 min 3 min 3 min       wrist ball roll 20x each 20x each 20x each 20x each  wrist curls RTB 2x10 each RTB 2x10      cones grasping 3 min 3 min 3 min 3 min  3min 3 min      Pegs grasping        yellow 30x fingerweb digit flexion Yellow 20x yellow 20x Yellow 30x Yellow 30x yellow 30x yellow 30x       flexbar towel twist  yellow 20x   yellow 20x        rubberband extension    2 rounds 2 rounds 2 rounds 2 rounds        15 15 15 15 10  10      Ther Activity                                       Gait Training                                       Modalities              10 10 10 10 10 10 10                   CP

## 2021-04-27 ENCOUNTER — OFFICE VISIT (OUTPATIENT)
Dept: PHYSICAL THERAPY | Facility: MEDICAL CENTER | Age: 31
End: 2021-04-27
Payer: COMMERCIAL

## 2021-04-27 DIAGNOSIS — Z47.89 AFTERCARE FOLLOWING SURGERY OF THE MUSCULOSKELETAL SYSTEM, NEC: Primary | ICD-10-CM

## 2021-04-27 DIAGNOSIS — S61.512D LACERATION OF LEFT WRIST, SUBSEQUENT ENCOUNTER: ICD-10-CM

## 2021-04-27 PROCEDURE — 97140 MANUAL THERAPY 1/> REGIONS: CPT

## 2021-04-27 NOTE — PROGRESS NOTES
Daily Note     Today's date: 2021  Patient name: Jen Mishra  : 1990  MRN: [id number]  Referring provider: Benito Ghosh MD  Dx:   Encounter Diagnosis     ICD-10-CM    1  Aftercare following surgery of the musculoskeletal system, NEC  Z47 89    2  Laceration of left wrist, subsequent encounter  S64 027D                   Subjective: Pt reports he still feels a lot of pressure in his hand when trying hard to form a fist      Objective: See treatment diary below      Assessment: Tolerated treatment well  Patient exhibited good technique with therapeutic exercises and would benefit from continued PT   Pt has significant improvement in his passive motion  SF still has some MCP joint stffness and extrinsic tightness to his extensors  He has significant improvement in his FDS and FDP functioning and strength especially when each is isolated  His neural component is still what is causing his clawing in his hand, preventing good pull of MCP flexion in natural composite fist  Extension is full when MCP is blocked at 0, preventing hyperextension  Thumb AA opposition to middle phalanx of MF  Pt able to perform pegs grasping today, unable to include thumb  Adjusted anti-claw splint for comfort- pt happy with fit    Plan: Continue per plan of care        Precautions: DOS 20  L FPL, FDS/FDP (zone 4), ECU, med/ulnar nerve allograft, CTR and guyon release  HEP: scar massage, wrist PROM, wrist tenodesis, gentle long flexor stretch individually, blocking, thumb opp, blocking, putty roll and pinch, putty thumb flexion, digit add, elastomer for scar, night extension orthosis, anti-claw orthosis for day    Manuals 4/1 4/6 4/8 4/13 4/15 4/20 4/22 4/27     US 50% with stretch, AROM 10 10 10 5 NP        IASTM scar massage 5  5 5 5 5 5 5     PROM digits 10 10 10 5 5 5 10 10     Place-hold extension, flexion, blocking 15 15 + splint geri 15 5 5 + measurements 10 10 10      30 30 30 20 25 20 25 25     Neuro Re-Ed Ther Ex             Towel bunches 2 min 2 min 3 min 3 min 3 min 3 min 3 min 3 min      wrist ball roll 20x each 20x each 20x each 20x each  wrist curls RTB 2x10 each RTB 2x10 RTB 2x10     cones grasping 3 min 3 min 3 min 3 min  3min 3 min NP     Pegs grasping         3 min     fingerweb digit flexion Yellow 20x yellow 20x Yellow 30x Yellow 30x yellow 30x yellow 30x yellow 30x yellow 30x     flexbar towel twist  yellow 20x   yellow 20x        rubberband extension    2 rounds 2 rounds 2 rounds 2 rounds  2 rounds      15 15 15 15 10  10 10     Ther Activity                                       Gait Training                                       Modalities             MH 10 10 10 10 10 10 10 10                  CP

## 2021-04-29 ENCOUNTER — OFFICE VISIT (OUTPATIENT)
Dept: PHYSICAL THERAPY | Facility: MEDICAL CENTER | Age: 31
End: 2021-04-29
Payer: COMMERCIAL

## 2021-04-29 DIAGNOSIS — Z47.89 AFTERCARE FOLLOWING SURGERY OF THE MUSCULOSKELETAL SYSTEM, NEC: Primary | ICD-10-CM

## 2021-04-29 DIAGNOSIS — S61.512D LACERATION OF LEFT WRIST, SUBSEQUENT ENCOUNTER: ICD-10-CM

## 2021-04-29 PROCEDURE — 97140 MANUAL THERAPY 1/> REGIONS: CPT

## 2021-04-29 NOTE — PROGRESS NOTES
Daily Note     Today's date: 2021  Patient name: Cristel Rivas  : 1990  MRN: [id number]  Referring provider: Clara Back MD  Dx:   Encounter Diagnosis     ICD-10-CM    1  Aftercare following surgery of the musculoskeletal system, NEC  Z47 89    2  Laceration of left wrist, subsequent encounter  S67 841D                   Subjective: Pt reports his ring and small fingers still feel so weak      Objective: See treatment diary below      Assessment: Tolerated treatment well  Patient exhibited good technique with therapeutic exercises and would benefit from continued PT  Pt has significant improvement in his compsotie flexion; still mild lag in his FDP and in SF  Thumb AAROM  opposition to RF    Plan: Continue per plan of care        Precautions: DOS 20  L FPL, FDS/FDP (zone 4), ECU, med/ulnar nerve allograft, CTR and guyon release  HEP: scar massage, wrist PROM, wrist tenodesis, gentle long flexor stretch individually, blocking, thumb opp, blocking, putty roll and pinch, putty thumb flexion, digit add, elastomer for scar, night extension orthosis, anti-claw orthosis for day    Manuals 4/1 4/6 4/8 4/13 4/15 4/20 4/22 4/27 4/29    US 50% with stretch, AROM 10 10 10 5 NP        IASTM scar massage 5  5 5 5 5 5 5 5    PROM digits 10 10 10 5 5 5 10 10 10    Place-hold extension, flexion, blocking 15 15 + splint geri 15 5 5 + measurements 10 10 10 10     30 30 30 20 25 20 25 25 25    Neuro Re-Ed                                                                                                        Ther Ex             Towel bunches 2 min 2 min 3 min 3 min 3 min 3 min 3 min 3 min 3 min     wrist ball roll 20x each 20x each 20x each 20x each  wrist curls RTB 2x10 each RTB 2x10 RTB 2x10 RTB 2x10    cones grasping 3 min 3 min 3 min 3 min  3min 3 min NP     Pegs grasping         3 min 3 min    fingerweb digit flexion Yellow 20x yellow 20x Yellow 30x Yellow 30x yellow 30x yellow 30x yellow 30x yellow 30x yellow 30x    flexbar towel twist  yellow 20x   yellow 20x        rubberband extension    2 rounds 2 rounds 2 rounds 2 rounds  2 rounds 2 rounds     15 15 15 15 10  10 10 10    Ther Activity                                       Gait Training                                       Modalities             MH 10 10 10 10 10 10 10 10 10                 CP

## 2021-05-05 ENCOUNTER — OFFICE VISIT (OUTPATIENT)
Dept: PHYSICAL THERAPY | Facility: MEDICAL CENTER | Age: 31
End: 2021-05-05
Payer: COMMERCIAL

## 2021-05-05 DIAGNOSIS — Z47.89 AFTERCARE FOLLOWING SURGERY OF THE MUSCULOSKELETAL SYSTEM, NEC: Primary | ICD-10-CM

## 2021-05-05 DIAGNOSIS — S61.512D LACERATION OF LEFT WRIST, SUBSEQUENT ENCOUNTER: ICD-10-CM

## 2021-05-05 PROCEDURE — 97140 MANUAL THERAPY 1/> REGIONS: CPT

## 2021-05-05 NOTE — PROGRESS NOTES
Daily Note     Today's date: 2021  Patient name: Kellie Gama  : 1990  MRN: [id number]  Referring provider: Da Alaniz MD  Dx:   Encounter Diagnosis     ICD-10-CM    1  Aftercare following surgery of the musculoskeletal system, NEC  Z47 89    2  Laceration of left wrist, subsequent encounter  S64 176D                   Subjective: Pt reported he now burned tips of his IF, MF and RF  Objective: See treatment diary below      Assessment: Tolerated treatment well  Patient exhibited good technique with therapeutic exercises and would benefit from continued PT  Pt had closed blisters to IF, MF and RF tips, some clear drainage seeped out with movement  Held prehensile activities from program  Able to tolerate fingerweb  ROM stable  Pt able to maintainn place hold thumb opp to PIP of MF    Plan: Continue per plan of care        Precautions: DOS 20  L FPL, FDS/FDP (zone 4), ECU, med/ulnar nerve allograft, CTR and guyon release  HEP: scar massage, wrist PROM, wrist tenodesis, gentle long flexor stretch individually, blocking, thumb opp, blocking, putty roll and pinch, putty thumb flexion, digit add, elastomer for scar, night extension orthosis, anti-claw orthosis for day    Manuals 4/1 4/6 4/8 4/13 4/15 4/20 4/22 4/27 4/29 5/5   US 50% with stretch, AROM 10 10 10 5 NP        IASTM scar massage 5  5 5 5 5 5 5 5 5   PROM digits 10 10 10 5 5 5 10 10 10 5   Place-hold extension, flexion, blocking 15 15 + splint geri 15 5 5 + measurements 10 10 10 10 10    30 30 30 20 25 20 25 25 25 20   Neuro Re-Ed                                                                                                        Ther Ex             Towel bunches 2 min 2 min 3 min 3 min 3 min 3 min 3 min 3 min 3 min     wrist ball roll 20x each 20x each 20x each 20x each  wrist curls RTB 2x10 each RTB 2x10 RTB 2x10 RTB 2x10    cones grasping 3 min 3 min 3 min 3 min  3min 3 min NP     Pegs grasping         3 min 3 min 3 min fingerweb digit flexion Yellow 20x yellow 20x Yellow 30x Yellow 30x yellow 30x yellow 30x yellow 30x yellow 30x  yellow 30x Red 20x   flexbar towel twist  yellow 20x   yellow 20x        rubberband extension    2 rounds 2 rounds 2 rounds 2 rounds  2 rounds 2 rounds 2 rounds    15 15 15 15 10  10 10 10 10   Ther Activity                                       Gait Training                                       Modalities             MH 10 10 10 10 10 10 10 10 10 10                CP

## 2021-05-07 ENCOUNTER — OFFICE VISIT (OUTPATIENT)
Dept: PHYSICAL THERAPY | Facility: MEDICAL CENTER | Age: 31
End: 2021-05-07
Payer: COMMERCIAL

## 2021-05-07 DIAGNOSIS — Z47.89 AFTERCARE FOLLOWING SURGERY OF THE MUSCULOSKELETAL SYSTEM, NEC: Primary | ICD-10-CM

## 2021-05-07 DIAGNOSIS — S61.512D LACERATION OF LEFT WRIST, SUBSEQUENT ENCOUNTER: ICD-10-CM

## 2021-05-07 PROCEDURE — 97140 MANUAL THERAPY 1/> REGIONS: CPT

## 2021-05-07 NOTE — PROGRESS NOTES
Daily Note     Today's date: 2021  Patient name: Laney Jose  : 1990  MRN: [id number]  Referring provider: Rae Ramos MD  Dx:   Encounter Diagnosis     ICD-10-CM    1  Aftercare following surgery of the musculoskeletal system, NEC  Z47 89    2  Laceration of left wrist, subsequent encounter  S64 036D                   Subjective: Pt wants to look into reducing appearance of scar along his ulnar wrist, wants to know what he can use      Objective: See treatment diary below      Assessment: Tolerated treatment well  Patient exhibited good technique with therapeutic exercises and would benefit from continued PT   Suggested mederma for his scar- pt recorded info  Pt had some weakness today in his comp flexion and active extension even with MCP blocking  Attempted to perform table top but Pt unable to perform isolated MCP flexion in his ulnar digits, only about 20 degrees at IF and MF; also about 40 degrees extension lag at his PIP jts  Held exercises that apply pressure to tip of digits due to blisters    Plan: Continue per plan of care        Precautions: DOS 20  L FPL, FDS/FDP (zone 4), ECU, med/ulnar nerve allograft, CTR and guyon release  HEP: scar massage, wrist PROM, wrist tenodesis, gentle long flexor stretch individually, blocking, thumb opp, blocking, putty roll and pinch, putty thumb flexion, digit add, elastomer for scar, night extension orthosis, anti-claw orthosis for day    Manuals                 IASTM scar massage 5         5   PROM digits 5         5   Place-hold extension, flexion, blocking 5         10    15         20   Neuro Re-Ed                                                                                                        Ther Ex                                       cones grasping             Pegs grasping 4 min         3 min   fingerweb digit flexion Red 30x         Red 20x   flexbar towel twist             rubberband extension Yellow 2 rounds 2 rounds    10         10   Ther Activity                                       Gait Training                                       Modalities              10         10                CP

## 2021-05-11 ENCOUNTER — OFFICE VISIT (OUTPATIENT)
Dept: PHYSICAL THERAPY | Facility: MEDICAL CENTER | Age: 31
End: 2021-05-11
Payer: COMMERCIAL

## 2021-05-11 DIAGNOSIS — Z47.89 AFTERCARE FOLLOWING SURGERY OF THE MUSCULOSKELETAL SYSTEM, NEC: Primary | ICD-10-CM

## 2021-05-11 DIAGNOSIS — S61.512D LACERATION OF LEFT WRIST, SUBSEQUENT ENCOUNTER: ICD-10-CM

## 2021-05-11 PROCEDURE — 97140 MANUAL THERAPY 1/> REGIONS: CPT

## 2021-05-11 NOTE — PROGRESS NOTES
Daily Note     Today's date: 2021  Patient name: Darius Sr  : 1990  MRN: [id number]  Referring provider: Sherryle Rhodes, MD  Dx:   Encounter Diagnosis     ICD-10-CM    1  Aftercare following surgery of the musculoskeletal system, NEC  Z47 89    2  Laceration of left wrist, subsequent encounter  S62 461F                   Subjective: Pt reported he injured his blisters on tips of his fingers      Objective: See treatment diary below      Assessment: Tolerated treatment well  Patient exhibited good technique with therapeutic exercises and would benefit from continued PT  Dressed open blisters on IF, MF, and RF with xeroform and coban, advised pt to use neosporin with coban if possible to allow closure  Pt able to actively form 90% full straight fist in IF to RF, SF still lags in slight hook fist position    Plan: Continue per plan of care        Precautions: DOS 20  L FPL, FDS/FDP (zone 4), ECU, med/ulnar nerve allograft, CTR and guyon release  HEP: scar massage, wrist PROM, wrist tenodesis, gentle long flexor stretch individually, blocking, thumb opp, blocking, putty roll and pinch, putty thumb flexion, digit add, elastomer for scar, night extension orthosis, anti-claw orthosis for day    Manuals         5                IASTM scar massage 5 5        5   PROM digits 5 5        5   Place-hold extension, flexion, blocking 5 5+ dressing change        10    15 20        20   Neuro Re-Ed                                                                                                        Ther Ex                                       cones grasping             Pegs grasping 4 min 4 min        3 min   fingerweb digit flexion Red 30x         Red 20x   flexbar towel twist             rubberband extension Yellow 2 rounds yellow 2 rounds        2 rounds    10 8        10   Ther Activity                                       Gait Training                                       Modalities  10 10        10                CP

## 2021-05-13 ENCOUNTER — OFFICE VISIT (OUTPATIENT)
Dept: PHYSICAL THERAPY | Facility: MEDICAL CENTER | Age: 31
End: 2021-05-13
Payer: COMMERCIAL

## 2021-05-13 DIAGNOSIS — Z47.89 AFTERCARE FOLLOWING SURGERY OF THE MUSCULOSKELETAL SYSTEM, NEC: Primary | ICD-10-CM

## 2021-05-13 DIAGNOSIS — S61.512D LACERATION OF LEFT WRIST, SUBSEQUENT ENCOUNTER: ICD-10-CM

## 2021-05-13 PROCEDURE — 97140 MANUAL THERAPY 1/> REGIONS: CPT

## 2021-05-13 NOTE — PROGRESS NOTES
Daily Note     Today's date: 2021  Patient name: Nenita Rhodes  : 1990  MRN: [id number]  Referring provider: Naya Valdovinos MD  Dx:   Encounter Diagnosis     ICD-10-CM    1  Aftercare following surgery of the musculoskeletal system, NEC  Z47 89    2  Laceration of left wrist, subsequent encounter  S65 720D                   Subjective: Pt reports he wants to take off skin along his IF      Objective: See treatment diary below      Assessment: Tolerated treatment well  Patient exhibited good technique with therapeutic exercises and would benefit from continued PT    Advised pt o leave his IF alone, skin is a good barrier for protection- pt understood  He must be using his and more because regular blisters have now turned into blood blsiters  MF blister appears to be the same, bloody drainage, granulation with necrotic tissue along border  RF is smaller in area by about 1-2 mm, pink appearance, no drainage and necrotic border  Provided pt with extra xeroform to hold him over the weekend  Held exercises with pressure applied to tips of his digits  Advised pt to go to an urgent care if his wounds worsen in appearance- pt understood  Overall, Pt;s active motion is gradually improving- straight fist is about 1 cm from palm with exception of SF clawing  Thumb opp able to touch tip of RF with MC blocked    Plan: Continue per plan of care        Precautions: DOS 20  L FPL, FDS/FDP (zone 4), ECU, med/ulnar nerve allograft, CTR and guyon release  HEP: scar massage, wrist PROM, wrist tenodesis, gentle long flexor stretch individually, blocking, thumb opp, blocking, putty roll and pinch, putty thumb flexion, digit add, elastomer for scar, night extension orthosis, anti-claw orthosis for day    Manuals        5                IASTM scar massage 5 5        5   PROM digits 5 5 5       5   Place-hold extension, flexion, blocking 5 5+ dressing change 10       10    15 20 15       20   Neuro Re-Ed                                                                                                        Ther Ex                                       cones grasping             Pegs grasping 4 min 4 min 4 min       3 min   fingerweb digit flexion Red 30x         Red 20x   flexbar towel twist             rubberband extension Yellow 2 rounds yellow 2 rounds Yellow 2 rounds       2 rounds    10 8 8       10   Ther Activity                                       Gait Training                                       Modalities              10 10 10       10                CP

## 2021-05-18 ENCOUNTER — OFFICE VISIT (OUTPATIENT)
Dept: URGENT CARE | Facility: MEDICAL CENTER | Age: 31
End: 2021-05-18
Payer: COMMERCIAL

## 2021-05-18 ENCOUNTER — OFFICE VISIT (OUTPATIENT)
Dept: PHYSICAL THERAPY | Facility: MEDICAL CENTER | Age: 31
End: 2021-05-18
Payer: COMMERCIAL

## 2021-05-18 VITALS
HEART RATE: 65 BPM | SYSTOLIC BLOOD PRESSURE: 137 MMHG | BODY MASS INDEX: 27.63 KG/M2 | RESPIRATION RATE: 16 BRPM | TEMPERATURE: 97.6 F | HEIGHT: 72 IN | DIASTOLIC BLOOD PRESSURE: 79 MMHG | WEIGHT: 204 LBS | OXYGEN SATURATION: 97 %

## 2021-05-18 DIAGNOSIS — Z47.89 AFTERCARE FOLLOWING SURGERY OF THE MUSCULOSKELETAL SYSTEM, NEC: Primary | ICD-10-CM

## 2021-05-18 DIAGNOSIS — S61.512D LACERATION OF LEFT WRIST, SUBSEQUENT ENCOUNTER: ICD-10-CM

## 2021-05-18 DIAGNOSIS — T23.032A: Primary | ICD-10-CM

## 2021-05-18 PROCEDURE — 99213 OFFICE O/P EST LOW 20 MIN: CPT | Performed by: PHYSICIAN ASSISTANT

## 2021-05-18 RX ORDER — AMOXICILLIN AND CLAVULANATE POTASSIUM 875; 125 MG/1; MG/1
1 TABLET, FILM COATED ORAL EVERY 12 HOURS SCHEDULED
Qty: 20 TABLET | Refills: 0 | Status: SHIPPED | OUTPATIENT
Start: 2021-05-18 | End: 2021-05-28

## 2021-05-18 NOTE — PROGRESS NOTES
Daily Note     Today's date: 2021  Patient name: Jen Mishra  : 1990  MRN: [id number]  Referring provider: Benito Ghosh MD  Dx:   Encounter Diagnosis     ICD-10-CM    1  Aftercare following surgery of the musculoskeletal system, NEC  Z47 89    2  Laceration of left wrist, subsequent encounter  S61 512D               no charge    Subjective: Pt reports he put on xeroform on weekend but wounds are not looking beter      Objective: See treatment diary below      Assessment: Tolerated treatment well  Patient exhibited good technique with therapeutic exercises and would benefit from continued PT  Pt's wounds appeared to be more inundated with white pallor around perimeter of each  Sent pt to [facility] to have them looked at by a physician since he does not have PCP    Plan: Continue per plan of care        Precautions: DOS 20  L FPL, FDS/FDP (zone 4), ECU, med/ulnar nerve allograft, CTR and guyon release  HEP: scar massage, wrist PROM, wrist tenodesis, gentle long flexor stretch individually, blocking, thumb opp, blocking, putty roll and pinch, putty thumb flexion, digit add, elastomer for scar, night extension orthosis, anti-claw orthosis for day    Manuals       5/5                IASTM scar massage 5 5  No charge      5   PROM digits 5 5 5       5   Place-hold extension, flexion, blocking 5 5+ dressing change 10       10    15 20 15       20   Neuro Re-Ed                                                                                                        Ther Ex                                       cones grasping             Pegs grasping 4 min 4 min 4 min       3 min   fingerweb digit flexion Red 30x         Red 20x   flexbar towel twist             rubberband extension Yellow 2 rounds yellow 2 rounds Yellow 2 rounds       2 rounds    10 8 8       10   Ther Activity                                       Gait Training                                       Modalities  10 10 10 10      10                CP

## 2021-05-18 NOTE — PATIENT INSTRUCTIONS
Patient was educated to keep 2nd, 3rd and 4th fingers clean and dry  Patient was given a referral for wound management  Patient was placed on Augmentin to prevent infection  Patient was told if symptoms get worst go to ED  Second-Degree Burn   WHAT YOU NEED TO KNOW:   A second-degree burn is also called a partial-thickness burn  A second-degree burn occurs when the first layer and some of the second layer of skin are burned  A superficial second-degree burn usually heals within 2 to 3 weeks with some scarring  A deep second-degree burn can take longer to heal  A second-degree burn can also get worse after a few days and become a third-degree burn  DISCHARGE INSTRUCTIONS:   Return to the emergency department if:   · You have a fast heartbeat or breathing  · You are not urinating  Call your doctor or burn specialist if:   · You have a fever  · You have increased redness, numbness, or swelling in the burn area  · Your wound or bandage is leaking pus and has a bad smell  · Your pain does not get better, or gets worse, even after you take pain medicine  · You have a dry mouth or eyes  · You are overly thirsty or tired  · You have dark yellow urine or urinate less than usual     · You have a headache or feel dizzy  · You have questions or concerns about your condition or care  Medicines:   · Medicines  may be given to decrease pain, prevent infection, or help your burn heal  They may be given as a pill or as an ointment applied to your skin  · Take your medicine as directed  Contact your healthcare provider if you think your medicine is not helping or if you have side effects  Tell him or her if you are allergic to any medicine  Keep a list of the medicines, vitamins, and herbs you take  Include the amounts, and when and why you take them  Bring the list or the pill bottles to follow-up visits  Carry your medicine list with you in case of an emergency      Burn care:   · Wash your hands with soap and water  Dry your hands with a clean towel or paper towel  · Remove old bandages  You may need to soak the bandage in water before you remove it so it will not stick to your wound  · Gently clean the burned area daily with mild soap and water  Pat the area dry  Look for any swelling or redness around the burn  Do not break closed blisters  You may cause a skin infection  · Apply cream or ointment to the burn with a cotton swab  Place a nonstick bandage over your burn  · Wrap a layer of gauze around the bandage to hold it in place  The wrap should be snug but not tight  It is too tight if you feel tingling or lose feeling in that area  · Apply gentle pressure for a few minutes if bleeding occurs  · Elevate your burned arm or leg above the level of your heart as often as you can  This will help decrease swelling and pain  Prop your burned arm or leg on pillows or blankets to keep it elevated comfortably  Self-care:   · Drink liquids as directed  You may need to drink extra liquid to help prevent dehydration  Ask how much liquid to drink each day and which liquids are best for you  · Go to physical therapy, if directed  Your muscles and joints may not work well after a second-degree burn  A physical therapist teaches you exercises to help improve movement and strength, and to decrease pain  Prevent second-degree burns:   · Do not leave cups, mugs, or bowls containing hot liquids at the edge of a table  Keep pot handles turned away from the stove front  · Do not leave a lit cigarette  Make sure it is no longer lit  Then dispose of it safely  · Store dangerous items out of the reach of children  Store cigarette lighters, matches, and chemicals where children cannot reach them  Use child safety latches on the door of the safe storage area  · Keep your water heater setting to low or medium  (90°F to 120°F, or 32°C to 48°C)      · Wear sunscreen that has a sun protectant factor (SPF) of 15 or higher  The sunscreen should also have ultraviolet A (UVA) and ultraviolet B (UVB) protection  Follow the directions on the label when you use sunscreen  Put on more sunscreen if you are in the sun for more than an hour  Reapply sunscreen often if you go swimming or are sweating  Follow up with your doctor or burn specialist as directed: You may need to return to have your wound checked and your bandage changed  Write down your questions so you remember to ask them during your visits  © Copyright [facility] [address] Information is for End User's use only and may not be sold, redistributed or otherwise used for commercial purposes  All illustrations and images included in CareNotes® are the copyrighted property of [employer]  or [employer]   The above information is an  only  It is not intended as medical advice for individual conditions or treatments  Talk to your doctor, nurse or pharmacist before following any medical regimen to see if it is safe and effective for you
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
## 2021-05-20 ENCOUNTER — OFFICE VISIT (OUTPATIENT)
Dept: PHYSICAL THERAPY | Facility: MEDICAL CENTER | Age: 31
End: 2021-05-20
Payer: COMMERCIAL

## 2021-05-20 DIAGNOSIS — S61.512D LACERATION OF LEFT WRIST, SUBSEQUENT ENCOUNTER: ICD-10-CM

## 2021-05-20 DIAGNOSIS — Z47.89 AFTERCARE FOLLOWING SURGERY OF THE MUSCULOSKELETAL SYSTEM, NEC: Primary | ICD-10-CM

## 2021-05-20 PROCEDURE — 97140 MANUAL THERAPY 1/> REGIONS: CPT

## 2021-05-20 NOTE — PROGRESS NOTES
Daily Note     Today's date: 2021  Patient name: Uriel Obrien  : 1990  MRN: [id number]  Referring provider: Marina Kwan MD  Dx:   Encounter Diagnosis     ICD-10-CM    1  Aftercare following surgery of the musculoskeletal system, NEC  Z47 89    2  Laceration of left wrist, subsequent encounter  S60 504D                   Subjective: Pt reports wound look a lot better      Objective: See treatment diary below      Assessment: Tolerated treatment well  Patient exhibited good technique with therapeutic exercises and would benefit from continued PT  Pt's open wounds are now closed and sealed, no drainage  Pt able to tolerate normal exercises today  AROM stable  Pt still has moderate stiffness and extrinsic tightness in SF limiting full comp flexion- only finger lagging in comp flexion  Digits still able to achieve full extension with MCP's blocked in neutral  Pt was able to perform a more tip to tip grasp for pegs apprehension which was difficult for thumb to apply enough pressure prior    Plan: Continue per plan of care        Precautions: DOS 20  L FPL, FDS/FDP (zone 4), ECU, med/ulnar nerve allograft, CTR and guyon release  HEP: scar massage, wrist PROM, wrist tenodesis, gentle long flexor stretch individually, blocking, thumb opp, blocking, putty roll and pinch, putty thumb flexion, digit add, elastomer for scar, night extension orthosis, anti-claw orthosis for day    Manuals                      IASTM scar massage 5 5  No charge         PROM digits 5 5 5  5        Place-hold extension, flexion, blocking 5 5+ dressing change 10  10         15 20 15  15        Neuro Re-Ed                                                                                                        Ther Ex             Pegs apprehension     4 min        Pegs grasping 4 min 4 min 4 min  4 min        fingerweb digit flexion Red 30x    Red 30x        flexbar towel twist             rubberband extension Yellow 2 rounds yellow 2 rounds Yellow 2 rounds  Yellow 2 rounds         10 8 8  15        Ther Activity                                       Gait Training                                       Modalities              10 10 10 10 10                     CP

## 2021-05-25 ENCOUNTER — OFFICE VISIT (OUTPATIENT)
Dept: PHYSICAL THERAPY | Facility: MEDICAL CENTER | Age: 31
End: 2021-05-25
Payer: COMMERCIAL

## 2021-05-25 DIAGNOSIS — S61.512D LACERATION OF LEFT WRIST, SUBSEQUENT ENCOUNTER: ICD-10-CM

## 2021-05-25 DIAGNOSIS — Z47.89 AFTERCARE FOLLOWING SURGERY OF THE MUSCULOSKELETAL SYSTEM, NEC: Primary | ICD-10-CM

## 2021-05-25 PROCEDURE — 97140 MANUAL THERAPY 1/> REGIONS: CPT

## 2021-05-25 NOTE — PROGRESS NOTES
Daily Note     Today's date: 2021  Patient name: Nisreen Goyal  : 1990  MRN: [id number]  Referring provider: Geovany Esqueda MD  Dx:   Encounter Diagnosis     ICD-10-CM    1  Aftercare following surgery of the musculoskeletal system, NEC  Z47 89    2  Laceration of left wrist, subsequent encounter  S65 148D                   Subjective: Pt reports he has more fatigue in his hand with exercises than pain      Objective: See treatment diary below      Assessment: Tolerated treatment well  Patient exhibited good technique with therapeutic exercises and would benefit from continued PT  Pt's wounds had reopened along his MF and IF, they are small in area and are superficial, good granulation tissue, perimeter is mature tissue, progressively healing, no draianage  Pt still has significant inability to flex SF MCP independently- provided yaneth loop for when Pt is not wearing splint to block his MCP's  Added putty press with a wide dowel, pt able to grasp but had trouble keep good  when applying downward force into putty to flatten  Plan: Continue per plan of care        Precautions: DOS 20  L FPL, FDS/FDP (zone 4), ECU, med/ulnar nerve allograft, CTR and guyon release  HEP: scar massage, wrist PROM, wrist tenodesis, gentle long flexor stretch individually, blocking, thumb opp, blocking, putty roll and pinch, putty thumb flexion, digit add, elastomer for scar, night extension orthosis, anti-claw orthosis for day    Manuals                     IASTM scar massage 5 5  No charge         PROM digits 5 5 5  5 5       Place-hold extension, flexion, blocking 5 5+ dressing change 10  10 10        15 20 15  15 15       Neuro Re-Ed                                                                                                        Ther Ex             Pegs apprehension     4 min        Pegs grasping 4 min 4 min 4 min  4 min 4 min       fingerweb digit flexion Red 30x    Red 30x Red 30x       flexbar towel twist             rubberband extension Yellow 2 rounds yellow 2 rounds Yellow 2 rounds  Yellow 2 rounds yellow 2 rounds       Putty press      yellow 3 min        10 8 8  15 15       Ther Activity                                       Gait Training                                       Modalities              10 10 10 10 10 10                    CP

## 2021-05-27 ENCOUNTER — OFFICE VISIT (OUTPATIENT)
Dept: PHYSICAL THERAPY | Facility: MEDICAL CENTER | Age: 31
End: 2021-05-27
Payer: COMMERCIAL

## 2021-05-27 DIAGNOSIS — S61.512D LACERATION OF LEFT WRIST, SUBSEQUENT ENCOUNTER: ICD-10-CM

## 2021-05-27 DIAGNOSIS — Z47.89 AFTERCARE FOLLOWING SURGERY OF THE MUSCULOSKELETAL SYSTEM, NEC: Primary | ICD-10-CM

## 2021-05-27 PROCEDURE — 97140 MANUAL THERAPY 1/> REGIONS: CPT

## 2021-05-27 NOTE — PROGRESS NOTES
Daily Note     Today's date: 2021  Patient name: Francisco Bah  : 1990  MRN: [id number]  Referring provider: Danna Pulido MD  Dx:   Encounter Diagnosis     ICD-10-CM    1  Aftercare following surgery of the musculoskeletal system, NEC  Z47 89    2  Laceration of left wrist, subsequent encounter  S68 479D                   Subjective: Pt reports hes still tght along his SF, painful to extensors with aggressive strethcing      Objective: See treatment diary below      Assessment: Tolerated treatment well  Patient exhibited good technique with therapeutic exercises and would benefit from continued PT  Pt's wounds are approaching closure, no drainage good granulation tissue  Pt had moderate lag in FDP contracture during composite flexion, no issues with isolated contraction; improved after FDP blocking in each digit    Plan: Continue per plan of care        Precautions: DOS 20  L FPL, FDS/FDP (zone 4), ECU, med/ulnar nerve allograft, CTR and guyon release  HEP: scar massage, wrist PROM, wrist tenodesis, gentle long flexor stretch individually, blocking, thumb opp, blocking, putty roll and pinch, putty thumb flexion, digit add, elastomer for scar, night extension orthosis, anti-claw orthosis for day    Manuals                    IASTM scar massage 5 5  No charge         PROM digits 5 5 5  5 5 5      Place-hold extension, flexion, blocking 5 5+ dressing change 10  10 10 15       15 20 15  15 15 20      Neuro Re-Ed                                                                                                        Ther Ex             Pegs apprehension     4 min  4 min      Pegs grasping 4 min 4 min 4 min  4 min 4 min 4 min      fingerweb digit flexion Red 30x    Red 30x Red 30x Red 30x      flexbar towel twist             rubberband extension Yellow 2 rounds yellow 2 rounds Yellow 2 rounds  Yellow 2 rounds yellow 2 rounds       Putty press      yellow 3 min yellow 3 min       10 8 8  15 15 15      Ther Activity                                       Gait Training                                       Modalities             MH 10 10 10 10 10 10 10                   CP

## 2021-06-01 ENCOUNTER — OFFICE VISIT (OUTPATIENT)
Dept: PHYSICAL THERAPY | Facility: MEDICAL CENTER | Age: 31
End: 2021-06-01
Payer: COMMERCIAL

## 2021-06-01 DIAGNOSIS — Z47.89 AFTERCARE FOLLOWING SURGERY OF THE MUSCULOSKELETAL SYSTEM, NEC: Primary | ICD-10-CM

## 2021-06-01 DIAGNOSIS — S61.512D LACERATION OF LEFT WRIST, SUBSEQUENT ENCOUNTER: ICD-10-CM

## 2021-06-01 PROCEDURE — 97140 MANUAL THERAPY 1/> REGIONS: CPT

## 2021-06-01 NOTE — PROGRESS NOTES
Daily Note     Today's date: 2021  Patient name: Nenita Rhodes  : 1990  MRN: [id number]  Referring provider: Naya Valdovinos MD  Dx:   Encounter Diagnosis     ICD-10-CM    1  Aftercare following surgery of the musculoskeletal system, NEC  Z47 89    2  Laceration of left wrist, subsequent encounter  S66 228D                   Subjective: Pt reports he's feeling good    Objective: See treatment diary below      Assessment: Tolerated treatment well  Patient exhibited good technique with therapeutic exercises and would benefit from continued PT  Pt was able to touch RF with 1st CMC blocked from extending  About 1 cm from SF tip  Composite flexion reaching about 80%, not including the SF which stays in hook fist  Place-hold table top is improving, able to hod about 20-25 MCP flexion in ulnar digits  Provided pt with putty exercises for home since wounds have closeed up and there is no pain with pressure  Demonstrated roll and pinch, didit abd, and digit add with yellow putty, pt understood each  Plan: Continue per plan of care        Precautions: DOS 20  L FPL, FDS/FDP (zone 4), ECU, med/ulnar nerve allograft, CTR and guyon release  HEP: scar massage, wrist PROM, wrist tenodesis, gentle long flexor stretch individually, blocking, thumb opp, blocking, putty roll and pinch, putty thumb flexion, digit add, elastomer for scar, night extension orthosis, anti-claw orthosis for day; putty roll/pinch, putty digit add/abd 2 min each    Manuals  5 6                  IASTM scar massage 5 5  No charge         PROM digits 5 5 5  5 5 5 5     Place-hold extension, flexion, blocking 5 5+ dressing change 10  10 10 15 15      15 20 15  15 15 20 20     Neuro Re-Ed                                                                                                        Ther Ex             Pegs apprehension     4 min  4 min 4 min     Pegs grasping 4 min 4 min 4 min  4 min 4 min 4 min 4 min     fingerweb digit flexion Red 30x    Red 30x Red 30x Red 30x Red 30x     flexbar towel twist             rubberband extension Yellow 2 rounds yellow 2 rounds Yellow 2 rounds  Yellow 2 rounds yellow 2 rounds       Putty press      yellow 3 min yellow 3 min yellow 3 min      10 8 8  15 15 15 15     Ther Activity                                       Gait Training                                       Modalities             MH 10 10 10 10 10 10 10 10                  CP

## 2021-06-03 ENCOUNTER — OFFICE VISIT (OUTPATIENT)
Dept: PHYSICAL THERAPY | Facility: MEDICAL CENTER | Age: 31
End: 2021-06-03
Payer: COMMERCIAL

## 2021-06-03 DIAGNOSIS — S61.512D LACERATION OF LEFT WRIST, SUBSEQUENT ENCOUNTER: ICD-10-CM

## 2021-06-03 DIAGNOSIS — Z47.89 AFTERCARE FOLLOWING SURGERY OF THE MUSCULOSKELETAL SYSTEM, NEC: Primary | ICD-10-CM

## 2021-06-03 PROCEDURE — 97140 MANUAL THERAPY 1/> REGIONS: CPT

## 2021-06-03 NOTE — PROGRESS NOTES
Daily Note     Today's date: 6/3/2021  Patient name: Yunior Last  : 1990  MRN: [id number]  Referring provider: Ursula Hargrove MD  Dx:   Encounter Diagnosis     ICD-10-CM    1  Aftercare following surgery of the musculoskeletal system, NEC  Z47 89    2  Laceration of left wrist, subsequent encounter  S65 778D                   Subjective: Pt reports the putty exercises are going well  Objective: See treatment diary below      Assessment: Tolerated treatment well  Patient exhibited good technique with therapeutic exercises and would benefit from continued PT  Pt's AROM is tending to plateau in his progress  Will await effect with using the putty for home to help strengthen intrinsics      Plan: Continue per plan of care        Precautions: DOS 20  L FPL, FDS/FDP (zone 4), ECU, med/ulnar nerve allograft, CTR and guyon release  HEP: scar massage, wrist PROM, wrist tenodesis, gentle long flexor stretch individually, blocking, thumb opp, blocking, putty roll and pinch, putty thumb flexion, digit add, elastomer for scar, night extension orthosis, anti-claw orthosis for day; putty roll/pinch, putty digit add/abd 2 min each    Manuals 5/7 5/11 5/13 5/18 5/20 5/25 5/27 6/1 6/3                 IASTM scar massage 5 5  No charge         PROM digits 5 5 5  5 5 5 5 5    Place-hold extension, flexion, blocking 5 5+ dressing change 10  10 10 15 15 15     15 20 15  15 15 20 20 20    Neuro Re-Ed                                                                                                        Ther Ex             Pegs apprehension     4 min  4 min 4 min 4 min    Pegs grasping 4 min 4 min 4 min  4 min 4 min 4 min 4 min 4 min    fingerweb digit flexion Red 30x    Red 30x Red 30x Red 30x Red 30x Red 30x    flexbar towel twist             rubberband extension Yellow 2 rounds yellow 2 rounds Yellow 2 rounds  Yellow 2 rounds yellow 2 rounds       Putty press      yellow 3 min yellow 3 min yellow 3 min yellow 3 min     10 8 8  15 15 15 15 15    Ther Activity                                       Gait Training                                       Modalities              10 10 10 10 10 10 10 10 10                 CP

## 2021-06-08 ENCOUNTER — OFFICE VISIT (OUTPATIENT)
Dept: PHYSICAL THERAPY | Facility: MEDICAL CENTER | Age: 31
End: 2021-06-08
Payer: COMMERCIAL

## 2021-06-08 DIAGNOSIS — S61.512D LACERATION OF LEFT WRIST, SUBSEQUENT ENCOUNTER: ICD-10-CM

## 2021-06-08 DIAGNOSIS — Z47.89 AFTERCARE FOLLOWING SURGERY OF THE MUSCULOSKELETAL SYSTEM, NEC: Primary | ICD-10-CM

## 2021-06-08 PROCEDURE — 97140 MANUAL THERAPY 1/> REGIONS: CPT

## 2021-06-08 NOTE — PROGRESS NOTES
Daily Note     Today's date: 2021  Patient name: Uriel Obrien  : 1990  MRN: [id number]  Referring provider: Marina Kwan MD  Dx:   Encounter Diagnosis     ICD-10-CM    1  Aftercare following surgery of the musculoskeletal system, NEC  Z47 89    2  Laceration of left wrist, subsequent encounter  S68 031D                   Subjective: Pt reports he still has significant tension to his SF extensors, difficult pulling into comp flexion      Objective: See treatment diary below      Assessment: Tolerated treatment well  Patient exhibited good technique with therapeutic exercises and would benefit from continued PT  Spent some time working on differential glide of FDP and FDS for IF-RF- kept MCP and PIP blocked in flexion  Better success with isolated DIP flexion vs  Combination of all digits together  Mild soreness post session    Plan: Continue per plan of care        Precautions: DOS 20  L FPL, FDS/FDP (zone 4), ECU, med/ulnar nerve allograft, CTR and guyon release  HEP: scar massage, wrist PROM, wrist tenodesis, gentle long flexor stretch individually, blocking, thumb opp, blocking, putty roll and pinch, putty thumb flexion, digit add, elastomer for scar, night extension orthosis, anti-claw orthosis for day; putty roll/pinch, putty digit add/abd 2 min each    Manuals  6 6/3 6/8                IASTM scar massage 5 5  No charge         PROM digits 5 5 5  5 5 5 5 5 5   Place-hold extension, flexion, blocking 5 5+ dressing change 10  10 10 15 15 15 10    15 20 15  15 15 20 20 20 15   Neuro Re-Ed                                                                                                        Ther Ex             Pegs apprehension     4 min  4 min 4 min 4 min 4 min   Pegs grasping 4 min 4 min 4 min  4 min 4 min 4 min 4 min 4 min 4 min   fingerweb digit flexion Red 30x    Red 30x Red 30x Red 30x Red 30x Red 30x Red 30x   flexbar towel twist rubberband extension Yellow 2 rounds yellow 2 rounds Yellow 2 rounds  Yellow 2 rounds yellow 2 rounds       Putty press      yellow 3 min yellow 3 min yellow 3 min yellow 3 min yellow 3 min    10 8 8  15 15 15 15 15 15   Ther Activity                                       Gait Training                                       Modalities             MH 10 10 10 10 10 10 10 10 10 10                CP

## 2021-06-10 ENCOUNTER — OFFICE VISIT (OUTPATIENT)
Dept: PHYSICAL THERAPY | Facility: MEDICAL CENTER | Age: 31
End: 2021-06-10
Payer: COMMERCIAL

## 2021-06-10 DIAGNOSIS — Z47.89 AFTERCARE FOLLOWING SURGERY OF THE MUSCULOSKELETAL SYSTEM, NEC: Primary | ICD-10-CM

## 2021-06-10 DIAGNOSIS — S61.512D LACERATION OF LEFT WRIST, SUBSEQUENT ENCOUNTER: ICD-10-CM

## 2021-06-10 PROCEDURE — 97140 MANUAL THERAPY 1/> REGIONS: CPT

## 2021-06-10 NOTE — PROGRESS NOTES
Daily Note     Today's date: 6/10/2021  Patient name: Deepak Waldron  : 1990  MRN: [id number]  Referring provider: Richard Mcdonald MD  Dx:   Encounter Diagnosis     ICD-10-CM    1  Aftercare following surgery of the musculoskeletal system, NEC  Z47 89    2  Laceration of left wrist, subsequent encounter  S61 142D                   Subjective: Pt reports his sensation has improved along his hypothenar region, feels that he can use it more      Objective: See treatment diary below      Assessment: Tolerated treatment well  Patient exhibited good technique with therapeutic exercises and would benefit from continued PT  Pt had improved DIP flexion in comp flexion, RF FDP still has some weakness when isolated   SF still lagging and tight in extrinsic extensors  Added digiball for more FDP activation    Plan: Continue per plan of care        Precautions: DOS 20  L FPL, FDS/FDP (zone 4), ECU, med/ulnar nerve allograft, CTR and guyon release  HEP: scar massage, wrist PROM, wrist tenodesis, gentle long flexor stretch individually, blocking, thumb opp, blocking, putty roll and pinch, putty thumb flexion, digit add, elastomer for scar, night extension orthosis, anti-claw orthosis for day; putty roll/pinch, putty digit add/abd 2 min each    Manuals 6/10         6/8                IASTM scar massage             PROM digits 10         5   Place-hold extension, flexion, blocking 5         10    15         15   Neuro Re-Ed                                                                                                        Ther Ex             Pegs apprehension 4 min         4 min   Pegs grasping 4 min         4 min   fingerweb digit flexion Red 30x         Red 30x   flexbar towel twist             rubberband extension             Putty press Yellow 3 min         yellow 3 min   digiball DIP fleixon Red 30x         15   Ther Activity              15                         Gait Training Modalities              10         10                CP

## 2021-06-15 ENCOUNTER — OFFICE VISIT (OUTPATIENT)
Dept: PHYSICAL THERAPY | Facility: MEDICAL CENTER | Age: 31
End: 2021-06-15
Payer: COMMERCIAL

## 2021-06-15 DIAGNOSIS — S61.512D LACERATION OF LEFT WRIST, SUBSEQUENT ENCOUNTER: ICD-10-CM

## 2021-06-15 DIAGNOSIS — Z47.89 AFTERCARE FOLLOWING SURGERY OF THE MUSCULOSKELETAL SYSTEM, NEC: Primary | ICD-10-CM

## 2021-06-15 PROCEDURE — 97140 MANUAL THERAPY 1/> REGIONS: CPT

## 2021-06-15 NOTE — PROGRESS NOTES
Daily Note     Today's date: 6/15/2021  Patient name: Akira Lopez  : 1990  MRN: [id number]  Referring provider: Janie Noguera MD  Dx:   Encounter Diagnosis     ICD-10-CM    1  Aftercare following surgery of the musculoskeletal system, NEC  Z47 89    2  Laceration of left wrist, subsequent encounter  S64 146D                   Subjective: Pt reports he goes back to the doctor on Friday  He also noted the doctor doesn't have any source of translation and doesn't speak Japanese himself  Objective: See treatment diary below      Assessment: Tolerated treatment well   Patient exhibited good technique with therapeutic exercises and would benefit from continued PT  Pt has some weakened opposition in thumb, can only hold is placed  Pt able to achieve more IP fleixon if MCP blocked in 70-90 degrees of flexion despite extrinsic tightness    Plan: POC NV     Precautions: DOS /  L FPL, FDS/FDP (zone 4), ECU, med/ulnar nerve allograft, CTR and guyon release  HEP: scar massage, wrist PROM, wrist tenodesis, gentle long flexor stretch individually, blocking, thumb opp, blocking, putty roll and pinch, putty thumb flexion, digit add, elastomer for scar, night extension orthosis, anti-claw orthosis for day; putty roll/pinch, putty digit add/abd 2 min each    Manuals 6/10 6/15        6/8                IASTM scar massage             PROM digits 10 10        5   Place-hold extension, flexion, blocking 5 5        10    15 15        15   Neuro Re-Ed                                                                                                        Ther Ex             Pegs apprehension 4 min 4 min        4 min   Pegs grasping 4 min 4 min        4 min   fingerweb digit flexion Red 30x Red 30x        Red 30x   flexbar towel twist             rubberband extension             Putty press Yellow 3 min NP        yellow 3 min   digiball DIP fleixon Red 30x Red 30x        15   Ther Activity              15 15 Gait Training                                       Modalities              10 10        10                CP

## 2021-06-17 ENCOUNTER — OFFICE VISIT (OUTPATIENT)
Dept: PHYSICAL THERAPY | Facility: MEDICAL CENTER | Age: 31
End: 2021-06-17
Payer: COMMERCIAL

## 2021-06-17 DIAGNOSIS — S61.512D LACERATION OF LEFT WRIST, SUBSEQUENT ENCOUNTER: ICD-10-CM

## 2021-06-17 DIAGNOSIS — Z47.89 AFTERCARE FOLLOWING SURGERY OF THE MUSCULOSKELETAL SYSTEM, NEC: Primary | ICD-10-CM

## 2021-06-17 PROCEDURE — 97140 MANUAL THERAPY 1/> REGIONS: CPT

## 2021-06-17 NOTE — PROGRESS NOTES
Daily Note/Re-evaluation     Today's date: 2021  Patient name: Vamshi Plata  : 1990  MRN: [id number]  Referring provider: Johnathon Dobson MD  Dx:   Encounter Diagnosis     ICD-10-CM    1  Aftercare following surgery of the musculoskeletal system, NEC  Z47 89    2  Laceration of left wrist, subsequent encounter  S62 455D                   Subjective: Pt reports he feels his sensation is improving along hypothenar region  has been using his L hand more with activities      Objective: See treatment diary below      Assessment: Tolerated treatment well  Patient exhibited good technique with therapeutic exercises and would benefit from continued PT   AROM digits  Active comp flexion  IF MF RF SF  90 80 60 15  80 80 80 90  40 25 25 65  Pt has significant improvement in his MCP flexion in digits II-IV   SF still stays in active hook fist; able to achieve similar ranges with MCP flexed to 70 degrees  SF still has extrinsic extensor tightness ( pt continuing to wear anti-claw splint)  Digits are able to fully extend with MCP's blocked at 0 degrees, preventing hyperextension  Th- composite fleixon MCP 35/ IP 55  Isolated flexion MCP 55 with CMC blocked from hyperextension  Active th opp to tip of MF; with CMC blocked can reach tip of RF  Sensation:Th 0 2g; IF 4 g; MF 4g; RF 200g; SF 4 g  Diminished light touch to thenar eminence  Diminished light touch to hypothenar eminence, pt reports it has improved   II L: 15 lbs         Goals  STG (2-3 weeks)  1: pt independent in HEP- met  2: full closure of wrist crease incision- met  3: improve wrist AROM 10 degrees- met  4: improve FDP/FDS/FPL to about 2+ or 3-: partially met  5: full digit extension with wrist flex to 40- MCPs need to be blocked    LTG (6-8 weeks)  1: Improve FOTO >60/100- not met  2: pt able to perform > 50% of ADL's- not met  3: pt able to form 50-75% comp fist- partially met  4: less than 30 degrees PIP extension in comp extension- MCP's need to be blocked  5: thumb IP flexion to 35-40 degrees- met  6: passive wrist ext to 40-45 degrees- met  7:  strength at elast  30lbs of uninvolved side- not met        Plan: Continue per plan of care   continue Pt at your discretion     Precautions: DOS 12/30/20  L FPL, FDS/FDP (zone 4), ECU, med/ulnar nerve allograft, CTR and guyon release  HEP: scar massage, wrist PROM, wrist tenodesis, gentle long flexor stretch individually, blocking, thumb opp, blocking, putty roll and pinch, putty thumb flexion, digit add, elastomer for scar, night extension orthosis, anti-claw orthosis for day; putty roll/pinch, putty digit add/abd 2 min each    Manuals 6/10 6/15 6/17       6/8                IASTM scar massage             PROM digits 10 10 10       5   Place-hold extension, flexion, blocking 5 5 5       10    15 15 15       15   Neuro Re-Ed                                                                                                        Ther Ex             Pegs apprehension 4 min 4 min 4 min       4 min   Pegs grasping 4 min 4 min 4 min       4 min   fingerweb digit flexion Red 30x Red 30x Red 30x       Red 30x   flexbar towel twist             rubberband extension             Putty press Yellow 3 min NP HEP       yellow 3 min   digiball DIP fleixon Red 30x Red 30x Red 30x       15   Ther Activity              15 15 15                       Gait Training                                       Modalities             MH 10 10 10       10                CP

## 2021-06-17 NOTE — LETTER
2021    Paulina Quintana, [address] [facility] [place]    Patient: Jb Zambrano   YOB: 1990   Date of Visit: 2021     Encounter Diagnosis     ICD-10-CM    1  Aftercare following surgery of the musculoskeletal system, NEC  Z47 89    2  Laceration of left wrist, subsequent encounter  S61 512D        Dear Dr Angelica Allen:    Thank you for your recent referral of Jb Zambrano  Please review the attached evaluation summary from Wiliam De Paz recent visit  Please verify that you agree with the plan of care by signing the attached order  If you have any questions or concerns, please do not hesitate to call  I sincerely appreciate the opportunity to share in the care of one of your patients and hope to have another opportunity to work with you in the near future  Sincerely,    Debbie Obrien, PT      Referring Provider:      I certify that I have read the below Plan of Care and certify the need for these services furnished under this plan of treatment while under my care  Paulina Quintana MD  [address] [place] [address]  Via Fax: [phone]          Daily Note/Re-evaluation     Today's date: 2021  Patient name: Jb Zambrano  : 1990  MRN: [id number]  Referring provider: Myesha Bertrand MD  Dx:   Encounter Diagnosis     ICD-10-CM    1  Aftercare following surgery of the musculoskeletal system, NEC  Z47 89    2  Laceration of left wrist, subsequent encounter  S61 512D                   Subjective: Pt reports he feels his sensation is improving along hypothenar region  has been using his L hand more with activities      Objective: See treatment diary below      Assessment: Tolerated treatment well   Patient exhibited good technique with therapeutic exercises and would benefit from continued PT   AROM digits  Active comp flexion  IF MF RF SF  90 80 60 15  80 80 80 90  40 25 25 65  Pt has significant improvement in his MCP flexion in digits II-IV  SF still stays in active hook fist; able to achieve similar ranges with MCP flexed to 70 degrees  SF still has extrinsic extensor tightness ( pt continuing to wear anti-claw splint)  Digits are able to fully extend with MCP's blocked at 0 degrees, preventing hyperextension  Th- composite fleixon MCP 35/ IP 55  Isolated flexion MCP 55 with CMC blocked from hyperextension  Active th opp to tip of MF; with CMC blocked can reach tip of RF  Sensation:Th 0 2g; IF 4 g; MF 4g; RF 200g; SF 4 g  Diminished light touch to thenar eminence  Diminished light touch to hypothenar eminence, pt reports it has improved   II L: 15 lbs         Goals  STG (2-3 weeks)  1: pt independent in HEP- met  2: full closure of wrist crease incision- met  3: improve wrist AROM 10 degrees- met  4: improve FDP/FDS/FPL to about 2+ or 3-: partially met  5: full digit extension with wrist flex to 40- MCPs need to be blocked    LTG (6-8 weeks)  1: Improve FOTO >60/100- not met  2: pt able to perform > 50% of ADL's- not met  3: pt able to form 50-75% comp fist- partially met  4: less than 30 degrees PIP extension in comp extension- MCP's need to be blocked  5: thumb IP flexion to 35-40 degrees- met  6: passive wrist ext to 40-45 degrees- met  7:  strength at elast  30lbs of uninvolved side- not met        Plan: Continue per plan of care   continue Pt at your discretion     Precautions: DOS 12/30/20  L FPL, FDS/FDP (zone 4), ECU, med/ulnar nerve allograft, CTR and guyon release  HEP: scar massage, wrist PROM, wrist tenodesis, gentle long flexor stretch individually, blocking, thumb opp, blocking, putty roll and pinch, putty thumb flexion, digit add, elastomer for scar, night extension orthosis, anti-claw orthosis for day; putty roll/pinch, putty digit add/abd 2 min each    Manuals 6/10 6/15 6/17       6/8                IASTM scar massage             PROM digits 10 10 10       5   Place-hold extension, flexion, blocking 5 5 5       10    15 15 15       15   Neuro Re-Ed                                                                                                        Ther Ex             Pegs apprehension 4 min 4 min 4 min       4 min   Pegs grasping 4 min 4 min 4 min       4 min   fingerweb digit flexion Red 30x Red 30x Red 30x       Red 30x   flexbar towel twist             rubberband extension             Putty press Yellow 3 min NP HEP       yellow 3 min   digiball DIP fleixon Red 30x Red 30x Red 30x       15   Ther Activity              15 15 15                       Gait Training                                       Modalities             MH 10 10 10       10                CP

## 2021-06-22 ENCOUNTER — OFFICE VISIT (OUTPATIENT)
Dept: PHYSICAL THERAPY | Facility: MEDICAL CENTER | Age: 31
End: 2021-06-22
Payer: COMMERCIAL

## 2021-06-22 DIAGNOSIS — S61.512D LACERATION OF LEFT WRIST, SUBSEQUENT ENCOUNTER: ICD-10-CM

## 2021-06-22 DIAGNOSIS — Z47.89 AFTERCARE FOLLOWING SURGERY OF THE MUSCULOSKELETAL SYSTEM, NEC: Primary | ICD-10-CM

## 2021-06-22 PROCEDURE — 97140 MANUAL THERAPY 1/> REGIONS: CPT

## 2021-06-22 NOTE — PROGRESS NOTES
Daily Note     Today's date: 2021  Patient name: Francisco Bah  : 1990  MRN: [id number]  Referring provider: Danna Pulido MD  Dx:   Encounter Diagnosis     ICD-10-CM    1  Aftercare following surgery of the musculoskeletal system, NEC  Z47 89    2  Laceration of left wrist, subsequent encounter  S63 100D                   Subjective: Pt reports he wasn't able to make his appt on Friday, seeing him this Friday      Objective: See treatment diary below      Assessment: Tolerated treatment well  Patient exhibited good technique with therapeutic exercises and would benefit from continued PT  Today pt was able to touch his MF to his palm for the first time  Also with 1st MC blocked, Pt was able to oppose his thumb to touch tip of SF, able to palpate stronger contraction of opponens  Pt completed program well with improved technique  Plan: Continue per plan of care        Precautions: DOS 20  L FPL, FDS/FDP (zone 4), ECU, med/ulnar nerve allograft, CTR and guyon release  HEP: scar massage, wrist PROM, wrist tenodesis, gentle long flexor stretch individually, blocking, thumb opp, blocking, putty roll and pinch, putty thumb flexion, digit add, elastomer for scar, night extension orthosis, anti-claw orthosis for day; putty roll/pinch, putty digit add/abd 2 min each    Manuals 6/10 6/15 6/17 6/22      6/8                IASTM scar massage             PROM digits 10 10 10 10      5   Place-hold extension, flexion, blocking 5 5 5 5      10    15 15 15 15      15   Neuro Re-Ed                                                                                                        Ther Ex             Pegs apprehension 4 min 4 min 4 min 4 min      4 min   Pegs grasping 4 min 4 min 4 min 4 min      4 min   fingerweb digit flexion Red 30x Red 30x Red 30x Red 30x      Red 30x   flexbar towel twist             rubberband extension             Putty press Yellow 3 min NP HEP       yellow 3 min   digiball DIP fleixon Red 30x Red 30x Red 30x Red 30x      15   Ther Activity              15 15 15 15                      Gait Training                                       Modalities             MH 10 10 10 10      10                CP

## 2021-06-24 ENCOUNTER — OFFICE VISIT (OUTPATIENT)
Dept: PHYSICAL THERAPY | Facility: MEDICAL CENTER | Age: 31
End: 2021-06-24
Payer: COMMERCIAL

## 2021-06-24 DIAGNOSIS — Z47.89 AFTERCARE FOLLOWING SURGERY OF THE MUSCULOSKELETAL SYSTEM, NEC: Primary | ICD-10-CM

## 2021-06-24 DIAGNOSIS — S61.512D LACERATION OF LEFT WRIST, SUBSEQUENT ENCOUNTER: ICD-10-CM

## 2021-06-24 PROCEDURE — 97140 MANUAL THERAPY 1/> REGIONS: CPT

## 2021-06-24 NOTE — PROGRESS NOTES
Daily Note     Today's date: 2021  Patient name: Vamshi Plata  : 1990  MRN: [id number]  Referring provider: Johnathon Dobson MD  Dx:   Encounter Diagnosis     ICD-10-CM    1  Aftercare following surgery of the musculoskeletal system, NEC  Z47 89    2  Laceration of left wrist, subsequent encounter  S69 905D                   Subjective: Pt reports he doesn't feel much of a stretch in his fingers  moving into extension unless he adds aggressive wrist extension with it  Objective: See treatment diary below      Assessment: Tolerated treatment well  Patient exhibited good technique with therapeutic exercises and would benefit from continued PT   Only mild tension with pt's long flexors even with wrist extension added, no reported pain with stretch  Improved IP flexion with SF MCP blocked in flexion- about 30-45 degrees for each (PIP/DIP)  Added weighted clothespin- added  along the edge allows pt to keep a better  for lateral pinch  2-pt a little more difficult- pt has trouble with APB functioning, able to hold some with place-hold but unable to keep rotation of thumb to place pulp facing palm vs  Ape thumb position  added brown pieces in place of apprehension pegs, pt able to complete    Plan: Continue per plan of care        Precautions: DOS 20  L FPL, FDS/FDP (zone 4), ECU, med/ulnar nerve allograft, CTR and guyon release  HEP: scar massage, wrist PROM, wrist tenodesis, gentle long flexor stretch individually, blocking, thumb opp, blocking, putty roll and pinch, putty thumb flexion, digit add, elastomer for scar, night extension orthosis, anti-claw orthosis for day; putty roll/pinch, putty digit add/abd 2 min each    Manuals 6/10 6/15 6/17 6/22 6/24     6                IASTM scar massage             PROM digits 10 10 10 10 10     5   Place-hold extension, flexion, blocking 5 5 5 5 5     10    15 15 15 15 15     15   Neuro Re-Ed Ther Ex             Pegs apprehension 4 min 4 min 4 min 4 min Chess pieces 4min     4 min   Pegs grasping 4 min 4 min 4 min 4 min NP     4 min   fingerweb digit flexion Red 30x Red 30x Red 30x Red 30x Red 30x     Red 30x   flexbar towel twist             rubberband extension     Red clothespin- gripper 30x        Putty press Yellow 3 min NP HEP       yellow 3 min   digiball DIP fleixon Red 30x Red 30x Red 30x Red 30x Red 30x     15   Ther Activity              15 15 15 15 15                     Gait Training                                       Modalities             MH 10 10 10 10 10     10                CP

## 2021-06-29 ENCOUNTER — OFFICE VISIT (OUTPATIENT)
Dept: PHYSICAL THERAPY | Facility: MEDICAL CENTER | Age: 31
End: 2021-06-29
Payer: COMMERCIAL

## 2021-06-29 DIAGNOSIS — Z47.89 AFTERCARE FOLLOWING SURGERY OF THE MUSCULOSKELETAL SYSTEM, NEC: Primary | ICD-10-CM

## 2021-06-29 DIAGNOSIS — S61.512D LACERATION OF LEFT WRIST, SUBSEQUENT ENCOUNTER: ICD-10-CM

## 2021-06-29 PROCEDURE — 97140 MANUAL THERAPY 1/> REGIONS: CPT

## 2021-06-29 NOTE — PROGRESS NOTES
Daily Note     Today's date: 2021  Patient name: Dex Garcia  : 1990  MRN: [id number]  Referring provider: Magdaleno Pardo MD  Dx:   Encounter Diagnosis     ICD-10-CM    1  Aftercare following surgery of the musculoskeletal system, NEC  Z47 89    2  Laceration of left wrist, subsequent encounter  S61 282D                   Subjective: Pt reports he saw Dr , wants him to wear anti-claw splint which he already has  Feels his progress will just take time, especially for his nerve repair  Pt feels he wants to gain more motion in his fingers  Still having extensor tightness in his SF which limits his motion      Objective: See treatment diary below      Assessment: Tolerated treatment well  Patient exhibited good technique with therapeutic exercises and would benefit from continued PT  demonstrated static progressive stretch for his SF; Told him we will try static progressive for his extrinsics in [facility] next visit so we can use time alloted to make sure he can achieve good stretch at home   Also advised him to continue to wear anti-claw splint for functioning  Pt understood      Plan: Continue per plan of care        Precautions: DOS 20  L FPL, FDS/FDP (zone 4), ECU, med/ulnar nerve allograft, CTR and guyon release  HEP: scar massage, wrist PROM, wrist tenodesis, gentle long flexor stretch individually, blocking, thumb opp, blocking, putty roll and pinch, putty thumb flexion, digit add, elastomer for scar, night extension orthosis, anti-claw orthosis for day; putty roll/pinch, putty digit add/abd 2 min each    Manuals 6/10 6/15 6/17 6/22 6/24 6/29    6                IASTM scar massage             PROM digits 10 10 10 10 10 10    5   Place-hold extension, flexion, blocking 5 5 5 5 5 5    10    15 15 15 15 15 15    15   Neuro Re-Ed                                                                                                        Ther Ex             Pegs apprehension 4 min 4 min 4 min 4 min Chess pieces 4min 4 min    4 min   Pegs grasping 4 min 4 min 4 min 4 min NP     4 min   fingerweb digit flexion Red 30x Red 30x Red 30x Red 30x Red 30x Red 30x    Red 30x   flexbar towel twist             rubberband extension     Red clothespin- gripper 30x Red 30x       Putty press Yellow 3 min NP HEP       yellow 3 min   digiball DIP fleixon Red 30x Red 30x Red 30x Red 30x Red 30x Red 30x    15   Ther Activity              15 15 15 15 15 15                    Gait Training                                       Modalities             MH 10 10 10 10 10 10    10                CP

## 2021-07-01 ENCOUNTER — OFFICE VISIT (OUTPATIENT)
Dept: PHYSICAL THERAPY | Facility: MEDICAL CENTER | Age: 31
End: 2021-07-01
Payer: COMMERCIAL

## 2021-07-01 DIAGNOSIS — Z47.89 AFTERCARE FOLLOWING SURGERY OF THE MUSCULOSKELETAL SYSTEM, NEC: Primary | ICD-10-CM

## 2021-07-01 DIAGNOSIS — S61.512D LACERATION OF LEFT WRIST, SUBSEQUENT ENCOUNTER: ICD-10-CM

## 2021-07-01 PROCEDURE — 97140 MANUAL THERAPY 1/> REGIONS: CPT

## 2021-07-01 NOTE — PROGRESS NOTES
Daily Note     Today's date: 2021  Patient name: Kathy Sparks  : 1990  MRN: [id number]  Referring provider: Elzbieta Awad MD  Dx:   Encounter Diagnosis     ICD-10-CM    1  Aftercare following surgery of the musculoskeletal system, NEC  Z47 89    2  Laceration of left wrist, subsequent encounter  S68 784D                   Subjective: Pt reports no change with strapping SF down into flex stretch      Objective: See treatment diary below      Assessment: Tolerated treatment well  Patient exhibited good technique with therapeutic exercises and would benefit from continued PT  fabricated statis progressive splint for extrinsic tightness in SF  Discussed with pt on why his SF is tight and how its is important to wear his anti claw splint with activities and new splint to stretch 2-3 x/day for 15 minutes then progress to 30 minutes, pt showed understanding    Plan: Continue per plan of care        Precautions: DOS 20  L FPL, FDS/FDP (zone 4), ECU, med/ulnar nerve allograft, CTR and guyon release  HEP: scar massage, wrist PROM, wrist tenodesis, gentle long flexor stretch individually, blocking, thumb opp, blocking, putty roll and pinch, putty thumb flexion, digit add, elastomer for scar, night extension orthosis, anti-claw orthosis for day; putty roll/pinch, putty digit add/abd 2 min each    Manuals 6/10 6/15 6/17 6/22 6/24 6/29 7   6                IASTM scar massage       splint geri 10      PROM digits 10 10 10 10 10 10 10   5   Place-hold extension, flexion, blocking 5 5 5 5 5 5 5   10    15 15 15 15 15 15 25   15   Neuro Re-Ed                                                                                                        Ther Ex             Pegs apprehension 4 min 4 min 4 min 4 min Chess pieces 4min 4 min 4 min   4 min   Pegs grasping 4 min 4 min 4 min 4 min NP     4 min   fingerweb digit flexion Red 30x Red 30x Red 30x Red 30x Red 30x Red 30x Red 30x   Red 30x   flexbar towel twist rubberband extension     Red clothespin- gripper 30x Red 30x Red 30x      Putty press Yellow 3 min NP HEP       yellow 3 min   digiball DIP fleixon Red 30x Red 30x Red 30x Red 30x Red 30x Red 30x Red 30x   15   Ther Activity              15 15 15 15 15 15 15                   Gait Training                                       Modalities             MH 10 10 10 10 10 10 10   10                CP

## 2021-07-07 ENCOUNTER — OFFICE VISIT (OUTPATIENT)
Dept: PHYSICAL THERAPY | Facility: MEDICAL CENTER | Age: 31
End: 2021-07-07
Payer: COMMERCIAL

## 2021-07-07 DIAGNOSIS — S61.512D LACERATION OF LEFT WRIST, SUBSEQUENT ENCOUNTER: ICD-10-CM

## 2021-07-07 DIAGNOSIS — Z47.89 AFTERCARE FOLLOWING SURGERY OF THE MUSCULOSKELETAL SYSTEM, NEC: Primary | ICD-10-CM

## 2021-07-07 PROCEDURE — 97140 MANUAL THERAPY 1/> REGIONS: CPT

## 2021-07-07 NOTE — PROGRESS NOTES
Daily Note     Today's date: 2021  Patient name: Daniela Gomez  : 1990  MRN: [id number]  Referring provider: Baylee Fontanez MD  Dx:   Encounter Diagnosis     ICD-10-CM    1  Aftercare following surgery of the musculoskeletal system, NEC  Z47 89    2  Laceration of left wrist, subsequent encounter  S64 468D                   Subjective: pt reports he has been working with his statis progressive splint for about 30 min to 1 hour  But he has a lot of pain in back of his hand in extensor tendons after  Objective: See treatment diary below      Assessment: Tolerated treatment well  Patient exhibited good technique with therapeutic exercises and would benefit from continued PT  refabricated custom anti claw splint to only include SF, pt able to don/doff, may redo next visit to get better MCP flexion  Pt still had significant extrinsic tightness in his extensors in SF      Plan: Continue per plan of care        Precautions: DOS 20  L FPL, FDS/FDP (zone 4), ECU, med/ulnar nerve allograft, CTR and guyon release  HEP: scar massage, wrist PROM, wrist tenodesis, gentle long flexor stretch individually, blocking, thumb opp, blocking, putty roll and pinch, putty thumb flexion, digit add, elastomer for scar, night extension orthosis, anti-claw orthosis for day; putty roll/pinch, putty digit add/abd 2 min each    Manuals 6/10 6/15 6/17 6/22 6/24 6/29 7                  IASTM scar massage       splint geri 10 splint geri 15     PROM digits 10 10 10 10 10 10 10 10     Place-hold extension, flexion, blocking 5 5 5 5 5 5 5 5      15 15 15 15 15 15 25 30     Neuro Re-Ed                                                                                                        Ther Ex             Pegs apprehension 4 min 4 min 4 min 4 min Chess pieces 4min 4 min 4 min 4 min     Pegs grasping 4 min 4 min 4 min 4 min NP        fingerweb digit flexion Red 30x Red 30x Red 30x Red 30x Red 30x Red 30x Red 30x Red 30x     flexbar towel twist             rubberband extension     Red clothespin- gripper 30x Red 30x Red 30x Red 30x     Putty press Yellow 3 min NP HEP          digiball DIP fleixon Red 30x Red 30x Red 30x Red 30x Red 30x Red 30x Red 30x Red 30x     Ther Activity              15 15 15 15 15 15 15 15                  Gait Training                                       Modalities             MH 10 10 10 10 10 10 10 10                  CP

## 2021-07-09 ENCOUNTER — OFFICE VISIT (OUTPATIENT)
Dept: PHYSICAL THERAPY | Facility: MEDICAL CENTER | Age: 31
End: 2021-07-09
Payer: COMMERCIAL

## 2021-07-09 DIAGNOSIS — S61.512D LACERATION OF LEFT WRIST, SUBSEQUENT ENCOUNTER: ICD-10-CM

## 2021-07-09 DIAGNOSIS — Z47.89 AFTERCARE FOLLOWING SURGERY OF THE MUSCULOSKELETAL SYSTEM, NEC: Primary | ICD-10-CM

## 2021-07-09 PROCEDURE — 97140 MANUAL THERAPY 1/> REGIONS: CPT

## 2021-07-09 NOTE — PROGRESS NOTES
Daily Note     Today's date: 2021  Patient name: Alix Craven  : 1990  MRN: [id number]  Referring provider: Roddy Mendez MD  Dx:   Encounter Diagnosis     ICD-10-CM    1  Aftercare following surgery of the musculoskeletal system, NEC  Z47 89    2  Laceration of left wrist, subsequent encounter  S67 802D                   Subjective: Pt reports his splint was bothering him on back of his hand  Objective: See treatment diary below      Assessment: Tolerated treatment well  Patient exhibited good technique with therapeutic exercises and would benefit from continued PT  Provided pt with foam padding to improve comfort of his anti-claw splint  Pt was able to achieve better IP flexion while wearing splint during TE program  Discussed with pt on how wearing splint will help prevent contractures, improve functioning, and help with nerve healing  Plan: Continue per plan of care        Precautions: DOS 20  L FPL, FDS/FDP (zone 4), ECU, med/ulnar nerve allograft, CTR and guyon release  HEP: scar massage, wrist PROM, wrist tenodesis, gentle long flexor stretch individually, blocking, thumb opp, blocking, putty roll and pinch, putty thumb flexion, digit add, elastomer for scar, night extension orthosis, anti-claw orthosis for day; putty roll/pinch, putty digit add/abd 2 min each    Manuals 6/10 6/15 6/17 6/22 6/24 6/29 7/1 7                 IASTM scar massage       splint geri 10 splint geri 15     PROM digits 10 10 10 10 10 10 10 10 10    Place-hold extension, flexion, blocking 5 5 5 5 5 5 5 5 5     15 15 15 15 15 15 25 30 15    Neuro Re-Ed                                                                                                        Ther Ex             Pegs apprehension 4 min 4 min 4 min 4 min Chess pieces 4min 4 min 4 min 4 min 4 min    Pegs grasping 4 min 4 min 4 min 4 min NP        fingerweb digit flexion Red 30x Red 30x Red 30x Red 30x Red 30x Red 30x Red 30x Red 30x Red 30x flexbar towel twist             rubberband extension     Red clothespin- gripper 30x Red 30x Red 30x Red 30x Red 30x    Putty press Yellow 3 min NP HEP          digiball DIP fleixon Red 30x Red 30x Red 30x Red 30x Red 30x Red 30x Red 30x Red 30x Red 30x    Ther Activity              15 15 15 15 15 15 15 15 15                 Gait Training                                       Modalities             MH 10 10 10 10 10 10 10 10 10                 CP

## 2021-07-13 ENCOUNTER — APPOINTMENT (OUTPATIENT)
Dept: PHYSICAL THERAPY | Facility: MEDICAL CENTER | Age: 31
End: 2021-07-13
Payer: COMMERCIAL

## 2021-07-14 ENCOUNTER — OFFICE VISIT (OUTPATIENT)
Dept: PHYSICAL THERAPY | Facility: MEDICAL CENTER | Age: 31
End: 2021-07-14
Payer: COMMERCIAL

## 2021-07-14 DIAGNOSIS — Z47.89 AFTERCARE FOLLOWING SURGERY OF THE MUSCULOSKELETAL SYSTEM, NEC: Primary | ICD-10-CM

## 2021-07-14 DIAGNOSIS — S61.512D LACERATION OF LEFT WRIST, SUBSEQUENT ENCOUNTER: ICD-10-CM

## 2021-07-14 PROCEDURE — 97140 MANUAL THERAPY 1/> REGIONS: CPT

## 2021-07-14 NOTE — PROGRESS NOTES
Daily Note     Today's date: 2021  Patient name: Apoorva Clark  : 1990  MRN: [id number]  Referring provider: Marlon Brasher MD  Dx:   Encounter Diagnosis     ICD-10-CM    1  Aftercare following surgery of the musculoskeletal system, NEC  Z47 89    2  Laceration of left wrist, subsequent encounter  S60 675D                   Subjective: Pt reports he still has trouble bending his SF, a lot of tension in his tendon    Objective: See treatment diary below      Assessment: Tolerated treatment well  Patient exhibited good technique with therapeutic exercises and would benefit from continued PT  Determines extensor tendon is main culprit for lack of motion at his SF  Pt has some return of intrinsic plus position with place   Performed IASTM to extensors of SF while on stretch, Pt still had some tension when stretching but easier time achieving end range  may add US NV to promote better extensibility    Plan: Continue per plan of care        Precautions: DOS 20  L FPL, FDS/FDP (zone 4), ECU, med/ulnar nerve allograft, CTR and guyon release  HEP: scar massage, wrist PROM, wrist tenodesis, gentle long flexor stretch individually, blocking, thumb opp, blocking, putty roll and pinch, putty thumb flexion, digit add, elastomer for scar, night extension orthosis, anti-claw orthosis for day; putty roll/pinch, putty digit add/abd 2 min each    Manuals 6/10 6/15 6/17 6/22 6/24 6/29 7 7 7                IASTM scar massage       splint geri 10 splint geri 15     PROM digits 10 10 10 10 10 10 10 10 10 10   Place-hold extension, flexion, blocking 5 5 5 5 5 5 5 5 5 5    15 15 15 15 15 15 25 30 15 15   Neuro Re-Ed                                                                                                        Ther Ex             Pegs apprehension 4 min 4 min 4 min 4 min Chess pieces 4min 4 min 4 min 4 min 4 min 4 min   Pegs grasping 4 min 4 min 4 min 4 min NP        fingerweb digit flexion Red 30x Red 30x Red 30x Red 30x Red 30x Red 30x Red 30x Red 30x Red 30x Red 30x   flexbar towel twist             rubberband extension     Red clothespin- gripper 30x Red 30x Red 30x Red 30x Red 30x Red 30x   Putty press Yellow 3 min NP HEP          digiball DIP fleixon Red 30x Red 30x Red 30x Red 30x Red 30x Red 30x Red 30x Red 30x Red 30x Red 30x   Ther Activity              15 15 15 15 15 15 15 15 15 15                Gait Training                                       Modalities             MH 10 10 10 10 10 10 10 10 10 10                CP

## 2021-07-15 ENCOUNTER — OFFICE VISIT (OUTPATIENT)
Dept: PHYSICAL THERAPY | Facility: MEDICAL CENTER | Age: 31
End: 2021-07-15
Payer: COMMERCIAL

## 2021-07-15 DIAGNOSIS — Z47.89 AFTERCARE FOLLOWING SURGERY OF THE MUSCULOSKELETAL SYSTEM, NEC: Primary | ICD-10-CM

## 2021-07-15 DIAGNOSIS — S61.512D LACERATION OF LEFT WRIST, SUBSEQUENT ENCOUNTER: ICD-10-CM

## 2021-07-15 PROCEDURE — 97140 MANUAL THERAPY 1/> REGIONS: CPT

## 2021-07-15 NOTE — PROGRESS NOTES
Daily Note     Today's date: 7/15/2021  Patient name: Link Vizcaino  : 1990  MRN: [id number]  Referring provider: James Carnes MD  Dx:   Encounter Diagnosis     ICD-10-CM    1  Aftercare following surgery of the musculoskeletal system, NEC  Z47 89    2  Laceration of left wrist, subsequent encounter  S60 769D                   Subjective: Pt reported he tolerated IASTM fine last visit      Objective: See treatment diary below      Assessment: Tolerated treatment well  Patient exhibited good technique with therapeutic exercises and would benefit from continued PT  Post manuals, able to passively flex SF about 90% of full comp flexion  No significant change in AROM of SF  Pt reported soreness in his flexor tendons after session  Pt has improved FPL functioning, abductor still very weak- difficult to place thumb in proper position    Plan: Continue per plan of care        Precautions: DOS 20  L FPL, FDS/FDP (zone 4), ECU, med/ulnar nerve allograft, CTR and guyon release  HEP: scar massage, wrist PROM, wrist tenodesis, gentle long flexor stretch individually, blocking, thumb opp, blocking, putty roll and pinch, putty thumb flexion, digit add, elastomer for scar, night extension orthosis, anti-claw orthosis for day; putty roll/pinch, putty digit add/abd 2 min each, anti claw splint for SF only, static progressive for comp flexion of SF    Manuals 7/15                         IASTM SF extensor 5            PROM digits 5            AA comp flexion, intrinsic + 5             15            Neuro Re-Ed                                                                                                        Ther Ex             Chess pieces 4 min                         fingerweb digit flexion Red 30x            Single clothespin- rubber handle Red 30x            digiball puppet hand and IP flexion Red 30x each            Putty press HEP             15            Ther Activity Gait Training                                       Modalities              10                         CP

## 2021-07-20 ENCOUNTER — OFFICE VISIT (OUTPATIENT)
Dept: PHYSICAL THERAPY | Facility: MEDICAL CENTER | Age: 31
End: 2021-07-20
Payer: COMMERCIAL

## 2021-07-20 DIAGNOSIS — S61.512D LACERATION OF LEFT WRIST, SUBSEQUENT ENCOUNTER: ICD-10-CM

## 2021-07-20 DIAGNOSIS — Z47.89 AFTERCARE FOLLOWING SURGERY OF THE MUSCULOSKELETAL SYSTEM, NEC: Primary | ICD-10-CM

## 2021-07-20 PROCEDURE — 97140 MANUAL THERAPY 1/> REGIONS: CPT

## 2021-07-20 NOTE — PROGRESS NOTES
Daily Note     Today's date: 2021  Patient name: Yumiko Pace  : 1990  MRN: [id number]  Referring provider: Vince Palmer MD  Dx:   Encounter Diagnosis     ICD-10-CM    1  Aftercare following surgery of the musculoskeletal system, NEC  Z47 89    2  Laceration of left wrist, subsequent encounter  S62 543D                   Subjective: Pt reports he feels the tendon on back of his hand is still very strong  diffcult bending finger into the palm      Objective: See treatment diary below      Assessment: Tolerated treatment well  Patient exhibited good technique with therapeutic exercises and would benefit from continued PT  Pt has improved mobility in EDC tendon of SF; with MCP blocked in 75 flexion able to flex PIP to 65 degrees  PROM MCP 90; PIP 75  For only a few reps Pt was able to place hold table top position    Plan: Continue per plan of care        Precautions: DOS 20  L FPL, FDS/FDP (zone 4), ECU, med/ulnar nerve allograft, CTR and guyon release  HEP: scar massage, wrist PROM, wrist tenodesis, gentle long flexor stretch individually, blocking, thumb opp, blocking, putty roll and pinch, putty thumb flexion, digit add, elastomer for scar, night extension orthosis, anti-claw orthosis for day; putty roll/pinch, putty digit add/abd 2 min each, anti claw splint for SF only, static progressive for comp flexion of SF    Manuals 7/15 7/20                        IASTM SF extensor 5 5           PROM digits 5 5           AA comp flexion, intrinsic + 5 5            15 15           Neuro Re-Ed                                                                                                        Ther Ex             Chess pieces 4 min 4 min           Pegs grasping  4 min           fingerweb digit flexion Red 30x Red 30x           Single clothespin- rubber handle Red 30x  red 30x           digiball puppet hand and IP flexion Red 30x each Red 30x           Putty press HEP             15 15 Ther Activity                                       Gait Training                                       Modalities              10 10                        CP

## 2021-07-22 ENCOUNTER — OFFICE VISIT (OUTPATIENT)
Dept: PHYSICAL THERAPY | Facility: MEDICAL CENTER | Age: 31
End: 2021-07-22
Payer: COMMERCIAL

## 2021-07-22 DIAGNOSIS — Z47.89 AFTERCARE FOLLOWING SURGERY OF THE MUSCULOSKELETAL SYSTEM, NEC: Primary | ICD-10-CM

## 2021-07-22 DIAGNOSIS — S61.512D LACERATION OF LEFT WRIST, SUBSEQUENT ENCOUNTER: ICD-10-CM

## 2021-07-22 PROCEDURE — 97140 MANUAL THERAPY 1/> REGIONS: CPT

## 2021-07-22 NOTE — PROGRESS NOTES
Daily Note     Today's date: 2021  Patient name: Jessica Gordon  : 1990  MRN: [id number]  Referring provider: Naya Brown MD  Dx:   Encounter Diagnosis     ICD-10-CM    1  Aftercare following surgery of the musculoskeletal system, NEC  Z47 89    2  Laceration of left wrist, subsequent encounter  S63 838D                   Subjective: Pt reports he still has tension in his SF extensors      Objective: See treatment diary below      Assessment: Tolerated treatment well  Patient exhibited good technique with therapeutic exercises and would benefit from continued PT  Place hold comp flexion pt able to keep MCP at least 50 degrees flexed with IP flexion  Advised pt to go back to using foams to practice pinching and digit add, pt understood  Pt able to perform lateral pinch yet required assistance for positioning  2-pt still difficult to position due to ABP weakness      Plan: Continue per plan of care        Precautions: DOS 20  L FPL, FDS/FDP (zone 4), ECU, med/ulnar nerve allograft, CTR and guyon release  HEP: scar massage, wrist PROM, wrist tenodesis, gentle long flexor stretch individually, blocking, thumb opp, blocking, putty roll and pinch, putty thumb flexion, digit add, elastomer for scar, night extension orthosis, anti-claw orthosis for day; putty roll/pinch, putty digit add/abd 2 min each, anti claw splint for SF only, static progressive for comp flexion of SF    Manuals 7/15 7/20 7/22                       IASTM SF extensor 5 5 5          PROM digits 5 5 5          AA comp flexion, intrinsic + 5 5 5           15 15 15          Neuro Re-Ed                                                                                                        Ther Ex             Chess pieces 4 min 4 min 4 min          Pegs grasping  4 min  4 min          fingerweb digit flexion Red 30x Red 30x  red 30x          Single clothespin- rubber handle Red 30x  red 30x  red 30x          digiball puppet hand and IP flexion Red 30x each Red 30x Red 30x          Putty press HEP             15 15 15          Ther Activity                                       Gait Training                                       Modalities             MH 10 10 10                       CP

## 2021-07-27 ENCOUNTER — OFFICE VISIT (OUTPATIENT)
Dept: PHYSICAL THERAPY | Facility: MEDICAL CENTER | Age: 31
End: 2021-07-27
Payer: COMMERCIAL

## 2021-07-27 DIAGNOSIS — Z47.89 AFTERCARE FOLLOWING SURGERY OF THE MUSCULOSKELETAL SYSTEM, NEC: Primary | ICD-10-CM

## 2021-07-27 DIAGNOSIS — S61.512D LACERATION OF LEFT WRIST, SUBSEQUENT ENCOUNTER: ICD-10-CM

## 2021-07-27 PROCEDURE — 97140 MANUAL THERAPY 1/> REGIONS: CPT

## 2021-07-27 NOTE — PROGRESS NOTES
Daily Note     Today's date: 2021  Patient name: Link Vizcaino  : 1990  MRN: [id number]  Referring provider: James Carnes MD  Dx:   Encounter Diagnosis     ICD-10-CM    1  Aftercare following surgery of the musculoskeletal system, NEC  Z47 89    2  Laceration of left wrist, subsequent encounter  S66 190D                   Subjective: Pt reports no problems with foams exercises      Objective: See treatment diary below      Assessment: Tolerated treatment well  Patient exhibited good technique with therapeutic exercises and would benefit from continued PT  Added US to dorsal EDC of SF along with IASTM  Pt tolerated well, Pt noted feeling less tension in extensor tendon  Pt improving in his digit add and abduction, less inclination to flex digits  Pt able to oppose thumb to tip of SF with appropriate blocking of CMC joint from hyperextending      Plan: Continue per plan of care        Precautions: DOS 20  L FPL, FDS/FDP (zone 4), ECU, med/ulnar nerve allograft, CTR and guyon release  HEP: scar massage, wrist PROM, wrist tenodesis, gentle long flexor stretch individually, blocking, thumb opp, blocking, putty roll and pinch, putty thumb flexion, digit add, elastomer for scar, night extension orthosis, anti-claw orthosis for day; putty roll/pinch, putty digit add/abd 2 min each, anti claw splint for SF only, static progressive for comp flexion of [facility]    Manuals 7/15 7/20 7/22 7/26             US 50% 5         IASTM SF extensor 5 5 5 5         PROM digits 5 5 5 5         AA comp flexion, intrinsic + 5 5 5 5          15 15 15 20         Neuro Re-Ed                                                                                                        Ther Ex             Chess pieces 4 min 4 min 4 min 4 min         Pegs grasping  4 min  4 min 4 min         fingerweb digit flexion Red 30x Red 30x  red 30x Red 30x         Single clothespin- rubber handle Red 30x  red 30x  red 30x  red 30x digiball puppet hand and IP flexion Red 30x each Red 30x Red 30x  red 30x         Putty press HEP             15 15 15 15         Ther Activity                                       Gait Training                                       Modalities             MH 10 10 10 10                      CP

## 2021-08-03 ENCOUNTER — OFFICE VISIT (OUTPATIENT)
Dept: PHYSICAL THERAPY | Facility: MEDICAL CENTER | Age: 31
End: 2021-08-03
Payer: COMMERCIAL

## 2021-08-03 DIAGNOSIS — S61.512D LACERATION OF LEFT WRIST, SUBSEQUENT ENCOUNTER: ICD-10-CM

## 2021-08-03 DIAGNOSIS — Z47.89 AFTERCARE FOLLOWING SURGERY OF THE MUSCULOSKELETAL SYSTEM, NEC: Primary | ICD-10-CM

## 2021-08-03 PROCEDURE — 97140 MANUAL THERAPY 1/> REGIONS: CPT

## 2021-08-03 NOTE — PROGRESS NOTES
Daily Note     Today's date: 8/3/2021  Patient name: Adriana Valera  : 1990  MRN: [id number]  Referring provider: Kavon Granger MD  Dx:   Encounter Diagnosis     ICD-10-CM    1  Aftercare following surgery of the musculoskeletal system, NEC  Z47 89    2  Laceration of left wrist, subsequent encounter  S69 933D                   Subjective: Pt reports he has more sensation along his thumb and ulnar side of his hand        Objective: See treatment diary below      Assessment: Tolerated treatment well  Patient exhibited good technique with therapeutic exercises and would benefit from continued PT  No loss of motion in his SF flexion  Pt still has severe weakness in his APB  Improvement in his ADP strength, still atrophy present      Plan: Continue per plan of care        Precautions: DOS 20  L FPL, FDS/FDP (zone 4), ECU, med/ulnar nerve allograft, CTR and guyon release  HEP: scar massage, wrist PROM, wrist tenodesis, gentle long flexor stretch individually, blocking, thumb opp, blocking, putty roll and pinch, putty thumb flexion, digit add, elastomer for scar, night extension orthosis, anti-claw orthosis for day; putty roll/pinch, putty digit add/abd 2 min each, anti claw splint for SF only, static progressive for comp flexion of [facility]    Manuals 7/15 7/20 7/22 7/26 8/3            US 50% 5 US 50% 5        IASTM SF extensor 5 5 5 5         PROM digits 5 5 5 5 5        AA comp flexion, intrinsic + 5 5 5 5 5         15 15 15 20 15        Neuro Re-Ed                                                                                                        Ther Ex             Chess pieces 4 min 4 min 4 min 4 min 4 min        Pegs grasping  4 min  4 min 4 min 4 min        fingerweb digit flexion Red 30x Red 30x  red 30x Red 30x Red 30x        Single clothespin- rubber handle Red 30x  red 30x  red 30x  red 30x Red 30x        digiball puppet hand and IP flexion Red 30x each Red 30x Red 30x  red 30x Red 30x Putty press HEP             15 15 15 15 15        Ther Activity                                       Gait Training                                       Modalities             MH 10 10 10 10 10                     CP

## 2021-08-10 ENCOUNTER — OFFICE VISIT (OUTPATIENT)
Dept: PHYSICAL THERAPY | Facility: MEDICAL CENTER | Age: 31
End: 2021-08-10
Payer: COMMERCIAL

## 2021-08-10 DIAGNOSIS — Z47.89 AFTERCARE FOLLOWING SURGERY OF THE MUSCULOSKELETAL SYSTEM, NEC: Primary | ICD-10-CM

## 2021-08-10 DIAGNOSIS — S61.512D LACERATION OF LEFT WRIST, SUBSEQUENT ENCOUNTER: ICD-10-CM

## 2021-08-10 PROCEDURE — 97140 MANUAL THERAPY 1/> REGIONS: CPT

## 2021-08-10 NOTE — PROGRESS NOTES
Daily Note     Today's date: 8/10/2021  Patient name: Jelena Quiroz  : 1990  MRN: [id number]  Referring provider: Sharmin Jones MD  Dx:   Encounter Diagnosis     ICD-10-CM    1  Aftercare following surgery of the musculoskeletal system, NEC  Z47 89    2  Laceration of left wrist, subsequent encounter  S65 425D                   Subjective: Pt reports he feels he gets better mobility after MH      Objective: See treatment diary below      Assessment: Tolerated treatment well  Patient exhibited good technique with therapeutic exercises and would benefit from continued PT  Pt improving in place hold table top- unable to get DIP full extension, unable to form fist from table top  No significant change in IP flexion in SF with MCP blocked in flexion  Improved opposition, able to touch SF with thumb  Active thumb abduction to 40 degrees  Able to perform lateral pinch, requires some assistance for positioning    Plan: Continue per plan of care        Precautions: DOS 20  L FPL, FDS/FDP (zone 4), ECU, med/ulnar nerve allograft, CTR and guyon release  HEP: scar massage, wrist PROM, wrist tenodesis, gentle long flexor stretch individually, blocking, thumb opp, blocking, putty roll and pinch, putty thumb flexion, digit add, elastomer for scar, night extension orthosis, anti-claw orthosis for day; putty roll/pinch, putty digit add/abd 2 min each, anti claw splint for SF only, static progressive for comp flexion of [facility]    Manuals 7/15 7/20 7/22 7/26 8/3 8/10           US 50% 5 US 50% 5 5       IASTM SF extensor 5 5 5 5         PROM digits 5 5 5 5 5 5       AA comp flexion, intrinsic + 5 5 5 5 5 5        15 15 15 20 15 15       Neuro Re-Ed                                                                                                        Ther Ex             Chess pieces 4 min 4 min 4 min 4 min 4 min 4 min       Pegs grasping  4 min  4 min 4 min 4 min 4 min       fingerweb digit flexion Red 30x Red 30x  red 30x Red 30x Red 30x  red 30x       Single clothespin- rubber handle Red 30x  red 30x  red 30x  red 30x Red 30x 30x red       digiball puppet hand and IP flexion Red 30x each Red 30x Red 30x  red 30x Red 30x Red 30x       Putty press HEP             15 15 15 15 15        Ther Activity                                       Gait Training                                       Modalities             MH 10 10 10 10 10 10                    CP

## 2021-08-12 ENCOUNTER — OFFICE VISIT (OUTPATIENT)
Dept: PHYSICAL THERAPY | Facility: MEDICAL CENTER | Age: 31
End: 2021-08-12
Payer: COMMERCIAL

## 2021-08-12 DIAGNOSIS — Z47.89 AFTERCARE FOLLOWING SURGERY OF THE MUSCULOSKELETAL SYSTEM, NEC: Primary | ICD-10-CM

## 2021-08-12 DIAGNOSIS — S61.512D LACERATION OF LEFT WRIST, SUBSEQUENT ENCOUNTER: ICD-10-CM

## 2021-08-12 PROCEDURE — 97140 MANUAL THERAPY 1/> REGIONS: CPT

## 2021-08-12 NOTE — PROGRESS NOTES
Daily Note     Today's date: 2021  Patient name: Rosie Jones  : 1990  MRN: [id number]  Referring provider: Mando Vo MD  Dx:   Encounter Diagnosis     ICD-10-CM    1  Aftercare following surgery of the musculoskeletal system, NEC  Z47 89    2  Laceration of left wrist, subsequent encounter  S62 650G                   Subjective: Pt reports he sees  tomorrow  Objective: See treatment diary below      Assessment: Tolerated treatment well  Patient exhibited good technique with therapeutic exercises and would benefit from continued PT  Pt had improved IP flexion in comp flexion after place- holds  Slight improvement in lateral apprehension with thumb  Still has poor activation of thumb abd for pronation of thumb to allow tip to tip pinch  Plan: Continue per plan of care        Precautions: DOS 20  L FPL, FDS/FDP (zone 4), ECU, med/ulnar nerve allograft, CTR and guyon release  HEP: scar massage, wrist PROM, wrist tenodesis, gentle long flexor stretch individually, blocking, thumb opp, blocking, putty roll and pinch, putty thumb flexion, digit add, elastomer for scar, night extension orthosis, anti-claw orthosis for day; putty roll/pinch, putty digit add/abd 2 min each, anti claw splint for SF only, static progressive for comp flexion of [facility]    Manuals 7/15 7/20 7/22 7/26 8/3 8/10 8/12          US 50% 5 US 50% 5 5 5      IASTM SF extensor 5 5 5 5         PROM digits 5 5 5 5 5 5 5      AA comp flexion, intrinsic + 5 5 5 5 5 5 5       15 15 15 20 15 15 15      Neuro Re-Ed                                                                                                        Ther Ex             Chess pieces 4 min 4 min 4 min 4 min 4 min 4 min 4 min      Pegs grasping  4 min  4 min 4 min 4 min 4 min 4 min      fingerweb digit flexion Red 30x Red 30x  red 30x Red 30x Red 30x  red 30x Red 30x      Single clothespin- rubber handle Red 30x  red 30x  red 30x  red 30x Red 30x 30x red 30x red digiball puppet hand and IP flexion Red 30x each Red 30x Red 30x  red 30x Red 30x Red 30x Red 30x      Putty press HEP             15 15 15 15 15  15      Ther Activity                                       Gait Training                                       Modalities             MH 10 10 10 10 10 10                    CP

## 2021-08-17 ENCOUNTER — OFFICE VISIT (OUTPATIENT)
Dept: PHYSICAL THERAPY | Facility: MEDICAL CENTER | Age: 31
End: 2021-08-17
Payer: COMMERCIAL

## 2021-08-17 DIAGNOSIS — S61.512D LACERATION OF LEFT WRIST, SUBSEQUENT ENCOUNTER: ICD-10-CM

## 2021-08-17 DIAGNOSIS — Z47.89 AFTERCARE FOLLOWING SURGERY OF THE MUSCULOSKELETAL SYSTEM, NEC: Primary | ICD-10-CM

## 2021-08-17 PROCEDURE — 97140 MANUAL THERAPY 1/> REGIONS: CPT

## 2021-08-17 NOTE — PROGRESS NOTES
Daily Note     Today's date: 2021  Patient name: Benjamin Loredo  : 1990  MRN: [id number]  Referring provider: Frieda Morley MD  Dx:   Encounter Diagnosis     ICD-10-CM    1  Aftercare following surgery of the musculoskeletal system, NEC  Z47 89    2  Laceration of left wrist, subsequent encounter  S68 520D                   Subjective: Pt reports he is feeling more sensation in his hand  Sometimes gets cramping  Doesn't matter on amount of activity      Objective: See treatment diary below      Assessment: Tolerated treatment well  Patient exhibited good technique with therapeutic exercises and would benefit from continued PT   Post manuals able to passively fully flex SF to touch palm  Pt has good strength in his FDP/FDS, tension in extrinsics still not permitting full active comp flexion even with MCP blocking  Pt notes he uses a ball to squeeze at home to work his fingers  Plan: Continue per plan of care        Precautions: DOS 20  L FPL, FDS/FDP (zone 4), ECU, med/ulnar nerve allograft, CTR and guyon release  HEP: scar massage, wrist PROM, wrist tenodesis, gentle long flexor stretch individually, blocking, thumb opp, blocking, putty roll and pinch, putty thumb flexion, digit add, elastomer for scar, night extension orthosis, anti-claw orthosis for day; putty roll/pinch, putty digit add/abd 2 min each, anti claw splint for SF only, static progressive for comp flexion of [facility]    Manuals 7/15 7/20 7/22 7/26 8/3 8/10 8/12 8/17         US 50% 5 US 50% 5 5 5 5     IASTM SF extensor 5 5 5 5         PROM digits 5 5 5 5 5 5 5 5     AA comp flexion, intrinsic + 5 5 5 5 5 5 5 5      15 15 15 20 15 15 15 15     Neuro Re-Ed                                                                                                        Ther Ex             Chess pieces 4 min 4 min 4 min 4 min 4 min 4 min 4 min 4 min     Pegs grasping  4 min  4 min 4 min 4 min 4 min 4 min      fingerweb digit flexion Red 30x Red 30x  red 30x Red 30x Red 30x  red 30x Red 30x Green 30x     Single clothespin- rubber handle Red 30x  red 30x  red 30x  red 30x Red 30x 30x red 30x red Red 30x     digiball puppet hand and IP flexion Red 30x each Red 30x Red 30x  red 30x Red 30x Red 30x Red 30x Red 30x     Putty press HEP             15 15 15 15 15  15 15     Ther Activity                                       Gait Training                                       Modalities             MH 10 10 10 10 10 10 10 10                  CP

## 2021-08-19 ENCOUNTER — APPOINTMENT (OUTPATIENT)
Dept: PHYSICAL THERAPY | Facility: MEDICAL CENTER | Age: 31
End: 2021-08-19
Payer: COMMERCIAL

## 2021-08-24 ENCOUNTER — OFFICE VISIT (OUTPATIENT)
Dept: PHYSICAL THERAPY | Facility: MEDICAL CENTER | Age: 31
End: 2021-08-24
Payer: COMMERCIAL

## 2021-08-24 DIAGNOSIS — Z47.89 AFTERCARE FOLLOWING SURGERY OF THE MUSCULOSKELETAL SYSTEM, NEC: Primary | ICD-10-CM

## 2021-08-24 DIAGNOSIS — S61.512D LACERATION OF LEFT WRIST, SUBSEQUENT ENCOUNTER: ICD-10-CM

## 2021-08-24 PROCEDURE — 97140 MANUAL THERAPY 1/> REGIONS: CPT

## 2021-08-24 NOTE — PROGRESS NOTES
Daily Note     Today's date: 2021  Patient name: Sandoval Rice  : 1990  MRN: [id number]  Referring provider: Aditi Soto MD  Dx:   Encounter Diagnosis     ICD-10-CM    1  Aftercare following surgery of the musculoskeletal system, NEC  Z47 89    2  Laceration of left wrist, subsequent encounter  S64 512D                   Subjective: Pt reports he is will be getting another surgery to improve his mobility on 9/10      Objective: See treatment diary below      Assessment: Tolerated treatment well  Patient exhibited good technique with therapeutic exercises and would benefit from continued PT  Discussed with pt we may be hitting a plateau in his progress  He said he is working a lot on his hand functioning but no significant improvement lately  No significant change over the past few weeks with US treatment and static progressive splint for extensor tendon  May benefit from another intervention to help with neuromuscular return and functioning  Plan: Continue per plan of care        Precautions: DOS 20  L FPL, FDS/FDP (zone 4), ECU, med/ulnar nerve allograft, CTR and guyon release  HEP: scar massage, wrist PROM, wrist tenodesis, gentle long flexor stretch individually, blocking, thumb opp, blocking, putty roll and pinch, putty thumb flexion, digit add, elastomer for scar, night extension orthosis, anti-claw orthosis for day; putty roll/pinch, putty digit add/abd 2 min each, anti claw splint for SF only, static progressive for comp flexion of [facility]    Manuals 7/15 7/20 7/22 7/26 8/3 8/10 8/12 8/17 8/24        US 50% 5 US 50% 5 5 5 5 5    IASTM SF extensor 5 5 5 5         PROM digits 5 5 5 5 5 5 5 5 5    AA comp flexion, intrinsic + 5 5 5 5 5 5 5 5 5     15 15 15 20 15 15 15 15 15    Neuro Re-Ed                                                                                                        Ther Ex             Chess pieces 4 min 4 min 4 min 4 min 4 min 4 min 4 min 4 min 4 min    Pegs grasping  4 min  4 min 4 min 4 min 4 min 4 min      fingerweb digit flexion Red 30x Red 30x  red 30x Red 30x Red 30x  red 30x Red 30x Green 30x green 30x    Single clothespin- rubber handle Red 30x  red 30x  red 30x  red 30x Red 30x 30x red 30x red Red 30x Red 30x    digiball puppet hand and IP flexion Red 30x each Red 30x Red 30x  red 30x Red 30x Red 30x Red 30x Red 30x Red 30x    Putty press HEP             15 15 15 15 15  15 15 15    Ther Activity                                       Gait Training                                       Modalities             MH 10 10 10 10 10 10 10 10 10                 CP

## 2021-08-26 ENCOUNTER — OFFICE VISIT (OUTPATIENT)
Dept: PHYSICAL THERAPY | Facility: MEDICAL CENTER | Age: 31
End: 2021-08-26
Payer: COMMERCIAL

## 2021-08-26 DIAGNOSIS — S61.512D LACERATION OF LEFT WRIST, SUBSEQUENT ENCOUNTER: ICD-10-CM

## 2021-08-26 DIAGNOSIS — Z47.89 AFTERCARE FOLLOWING SURGERY OF THE MUSCULOSKELETAL SYSTEM, NEC: Primary | ICD-10-CM

## 2021-08-26 PROCEDURE — 97140 MANUAL THERAPY 1/> REGIONS: CPT

## 2021-08-26 NOTE — PROGRESS NOTES
Daily Note     Today's date: 2021  Patient name: Ciara Inman  : 1990  MRN: [id number]  Referring provider: Jeremias Estrada MD  Dx:   Encounter Diagnosis     ICD-10-CM    1  Aftercare following surgery of the musculoskeletal system, NEC  Z47 89    2  Laceration of left wrist, subsequent encounter  S66 051D                   Subjective: Pt reports he still is getting cramping in his ulnar digit flexors      Objective: See treatment diary below      Assessment: Tolerated treatment well  Patient exhibited good technique with therapeutic exercises and would benefit from continued PT  Pt's opponens is working better in reaching to tip of SF; however his ABP is still very weak  Also his intrinsics are very poor in his SF, improving in RF  Will perform re-paco next week  Let Pt know we will wait to see if  wants him to continue with therapy after he sees him on the       Plan: Continue per plan of care        Precautions: DOS 20  L FPL, FDS/FDP (zone 4), ECU, med/ulnar nerve allograft, CTR and guyon release  HEP: scar massage, wrist PROM, wrist tenodesis, gentle long flexor stretch individually, blocking, thumb opp, blocking, putty roll and pinch, putty thumb flexion, digit add, elastomer for scar, night extension orthosis, anti-claw orthosis for day; putty roll/pinch, putty digit add/abd 2 min each, anti claw splint for SF only, static progressive for comp flexion of [facility]    Manuals 7/15 7/20 7/22 7/26 8/3 8/10 8/12 8/17 8/24 8/26       US 50% 5 US 50% 5 5 5 5 5 5   IASTM SF extensor 5 5 5 5         PROM digits 5 5 5 5 5 5 5 5 5 5   AA comp flexion, intrinsic + 5 5 5 5 5 5 5 5 5 5    15 15 15 20 15 15 15 15 15 15   Neuro Re-Ed                                                                                                        Ther Ex             Chess pieces 4 min 4 min 4 min 4 min 4 min 4 min 4 min 4 min 4 min 4 min   Pegs grasping  4 min  4 min 4 min 4 min 4 min 4 min      fingerweb digit flexion Red 30x Red 30x  red 30x Red 30x Red 30x  red 30x Red 30x Green 30x green 30x Green 30x   Single clothespin- rubber handle Red 30x  red 30x  red 30x  red 30x Red 30x 30x red 30x red Red 30x Red 30x Red 30x   digiball puppet hand and IP flexion Red 30x each Red 30x Red 30x  red 30x Red 30x Red 30x Red 30x Red 30x Red 30x Green 30x   Putty press HEP             15 15 15 15 15  15 15 15 15   Ther Activity                                       Gait Training                                       Modalities             MH 10 10 10 10 10 10 10 10 10 10                CP

## 2021-08-31 ENCOUNTER — OFFICE VISIT (OUTPATIENT)
Dept: PHYSICAL THERAPY | Facility: MEDICAL CENTER | Age: 31
End: 2021-08-31
Payer: COMMERCIAL

## 2021-08-31 DIAGNOSIS — S61.512D LACERATION OF LEFT WRIST, SUBSEQUENT ENCOUNTER: ICD-10-CM

## 2021-08-31 DIAGNOSIS — Z47.89 AFTERCARE FOLLOWING SURGERY OF THE MUSCULOSKELETAL SYSTEM, NEC: Primary | ICD-10-CM

## 2021-08-31 PROCEDURE — 97140 MANUAL THERAPY 1/> REGIONS: CPT

## 2021-08-31 NOTE — PROGRESS NOTES
Daily Note     Today's date: 2021  Patient name: Maryelizabeth Mcardle  : 1990  MRN: [id number]  Referring provider: Francesco Bishop MD  Dx:   Encounter Diagnosis     ICD-10-CM    1  Aftercare following surgery of the musculoskeletal system, NEC  Z47 89    2  Laceration of left wrist, subsequent encounter  S60 904D                   Subjective: Pt reports hand is the same  Sees Dr next week      Objective: See treatment diary below      Assessment: Tolerated treatment well  Patient exhibited good technique with therapeutic exercises and would benefit from continued PT  Discussed on placing therapy on hold after next visit to see what Dr Rajeev Holt wants to do moving forward, pt understood  Mild weakness in his SF flexion during MCP blocking; full hook fist      Plan: Continue per plan of care        Precautions: DOS 20  L FPL, FDS/FDP (zone 4), ECU, med/ulnar nerve allograft, CTR and guyon release  HEP: scar massage, wrist PROM, wrist tenodesis, gentle long flexor stretch individually, blocking, thumb opp, blocking, putty roll and pinch, putty thumb flexion, digit add, elastomer for scar, night extension orthosis, anti-claw orthosis for day; putty roll/pinch, putty digit add/abd 2 min each, anti claw splint for SF only, static progressive for comp flexion of SF    Manuals              5                PROM digits 10         5   AA comp flexion, intrinsic + 5         5    15         15   Neuro Re-Ed                                                                                                        Ther Ex             Chess pieces 4 min         4 min                fingerweb digit flexion Green 30x         Green 30x   Single clothespin- rubber handle Red 30x         Red 30x   digiball puppet hand and IP flexion Red 30x         Green 30x   Putty press HEP             15         15   Ther Activity                                       Gait Training Modalities              10         10                CP

## 2021-09-16 ENCOUNTER — OFFICE VISIT (OUTPATIENT)
Dept: PHYSICAL THERAPY | Facility: MEDICAL CENTER | Age: 31
End: 2021-09-16
Payer: COMMERCIAL

## 2021-09-16 DIAGNOSIS — S61.512D LACERATION OF LEFT WRIST, SUBSEQUENT ENCOUNTER: ICD-10-CM

## 2021-09-16 DIAGNOSIS — Z47.89 AFTERCARE FOLLOWING SURGERY OF THE MUSCULOSKELETAL SYSTEM, NEC: Primary | ICD-10-CM

## 2021-09-16 PROCEDURE — 97140 MANUAL THERAPY 1/> REGIONS: CPT

## 2021-09-16 NOTE — PROGRESS NOTES
Daily Note     Today's date: 2021  Patient name: Sandoval Rice  : 1990  MRN: [id number]  Referring provider: Aditi Soto MD  Dx:   Encounter Diagnosis     ICD-10-CM    1  Aftercare following surgery of the musculoskeletal system, NEC  Z47 89    2  Laceration of left wrist, subsequent encounter  S64 209E                   Subjective: Pt reports Dr Dorina Guerrero is happy with his progress  Can return in 3 months for follow up as needed   explained to him it may take more time for progress, especially for neural regeneration for intrinsics to return  Objective: See treatment diary below      Assessment: Tolerated treatment well  Patient exhibited good technique with therapeutic exercises and would benefit from continued PT  Slight increase in lag in FDP and FDS activation with MCP blocked in flexion  Reduce frequency to 1x/week  Plan: Continue per plan of care        Precautions: DOS 20  L FPL, FDS/FDP (zone 4), ECU, med/ulnar nerve allograft, CTR and guyon release  HEP: scar massage, wrist PROM, wrist tenodesis, gentle long flexor stretch individually, blocking, thumb opp, blocking, putty roll and pinch, putty thumb flexion, digit add, elastomer for scar, night extension orthosis, anti-claw orthosis for day; putty roll/pinch, putty digit add/abd 2 min each, anti claw splint for SF only, static progressive for comp flexion of [facility]    Manuals              5                PROM digits 10 10        5   AA comp flexion, intrinsic + 5 5        5    15 15        15   Neuro Re-Ed                                                                                                        Ther Ex             Chess pieces 4 min 4 min        4 min                fingerweb digit flexion Green 30x Green 30x        Green 30x   Single clothespin- rubber handle Red 30x Green 30x        Red 30x   digiball puppet hand and IP flexion Red 30x Red 30x        Green 30x   Putty press HEP 15 15        15   Ther Activity                                       Gait Training                                       Modalities              10 10        10                CP

## 2021-09-21 ENCOUNTER — OFFICE VISIT (OUTPATIENT)
Dept: PHYSICAL THERAPY | Facility: MEDICAL CENTER | Age: 31
End: 2021-09-21
Payer: COMMERCIAL

## 2021-09-21 DIAGNOSIS — Z47.89 AFTERCARE FOLLOWING SURGERY OF THE MUSCULOSKELETAL SYSTEM, NEC: Primary | ICD-10-CM

## 2021-09-21 DIAGNOSIS — S61.512D LACERATION OF LEFT WRIST, SUBSEQUENT ENCOUNTER: ICD-10-CM

## 2021-09-21 PROCEDURE — 97140 MANUAL THERAPY 1/> REGIONS: CPT

## 2021-09-21 NOTE — PROGRESS NOTES
Daily Note     Today's date: 2021  Patient name: Meg Naylor  : 1990  MRN: [id number]  Referring provider: Vinh Portillo MD  Dx:   Encounter Diagnosis     ICD-10-CM    1  Aftercare following surgery of the musculoskeletal system, NEC  Z47 89    2  Laceration of left wrist, subsequent encounter  S69 315D                   Subjective: Pt reports his sensation is returning just slow  Objective: See treatment diary below      Assessment: Tolerated treatment well  Patient exhibited good technique with therapeutic exercises and would benefit from continued PT  Pt had improved comp flexion with SF with place hold may have some contribution from interossei  Decreased extrinsic tension during passive flexion stretch    Plan: Continue per plan of care        Precautions: DOS 20  L FPL, FDS/FDP (zone 4), ECU, med/ulnar nerve allograft, CTR and guyon release  HEP: scar massage, wrist PROM, wrist tenodesis, gentle long flexor stretch individually, blocking, thumb opp, blocking, putty roll and pinch, putty thumb flexion, digit add, elastomer for scar, night extension orthosis, anti-claw orthosis for day; putty roll/pinch, putty digit add/abd 2 min each, anti claw splint for SF only, static progressive for comp flexion of [facility]    Manuals              5                PROM digits 10 10 10       5   AA comp flexion, intrinsic + 5 5 5       5    15 15 15       15   Neuro Re-Ed                                                                                                        Ther Ex             Chess pieces 4 min 4 min 4 min       4 min                fingerweb digit flexion Green 30x Green 30x Green 30x       Green 30x   Single clothespin- rubber handle Red 30x Green 30x Green 30x       Red 30x   digiball puppet hand and IP flexion Red 30x Red 30x Red 30x       Green 30x   Putty press HEP             15 15 15       15   Ther Activity Gait Training                                       Modalities              10 10 10       10                CP

## 2021-09-23 ENCOUNTER — APPOINTMENT (OUTPATIENT)
Dept: PHYSICAL THERAPY | Facility: MEDICAL CENTER | Age: 31
End: 2021-09-23
Payer: COMMERCIAL

## 2021-09-28 ENCOUNTER — OFFICE VISIT (OUTPATIENT)
Dept: PHYSICAL THERAPY | Facility: MEDICAL CENTER | Age: 31
End: 2021-09-28
Payer: COMMERCIAL

## 2021-09-28 DIAGNOSIS — Z47.89 AFTERCARE FOLLOWING SURGERY OF THE MUSCULOSKELETAL SYSTEM, NEC: Primary | ICD-10-CM

## 2021-09-28 DIAGNOSIS — S61.512D LACERATION OF LEFT WRIST, SUBSEQUENT ENCOUNTER: ICD-10-CM

## 2021-09-28 PROCEDURE — 97140 MANUAL THERAPY 1/> REGIONS: CPT

## 2021-09-28 NOTE — PROGRESS NOTES
Daily Note     Today's date: 2021  Patient name: Ezio Bronson  : 1990  MRN: [id number]  Referring provider: Chuck Sever, MD  Dx:   Encounter Diagnosis     ICD-10-CM    1  Aftercare following surgery of the musculoskeletal system, NEC  Z47 89    2  Laceration of left wrist, subsequent encounter  S69 205D                   Subjective: Pt reports he feels tightness and cramping in his digit flexors in his palm sometimes  Objective: See treatment diary below      Assessment: Tolerated treatment well  Patient exhibited good technique with therapeutic exercises and would benefit from continued PT  Pt has decreased tension to his SF extensors, able to passively make full fist  Still weakness in his FDS/P of SF  Added flexbar, pt has improved gripping in his digits II-IV, pt tolerated well      Plan: Continue per plan of care        Precautions: DOS 20  L FPL, FDS/FDP (zone 4), ECU, med/ulnar nerve allograft, CTR and guyon release  HEP: scar massage, wrist PROM, wrist tenodesis, gentle long flexor stretch individually, blocking, thumb opp, blocking, putty roll and pinch, putty thumb flexion, digit add, elastomer for scar, night extension orthosis, anti-claw orthosis for day; putty roll/pinch, putty digit add/abd 2 min each, anti claw splint for SF only, static progressive for comp flexion of [facility]    Manuals              5                PROM digits 10 10 10 10      5   AA comp flexion, intrinsic + 5 5 5 5      5    15 15 15 15      15   Neuro Re-Ed                                                                                                        Ther Ex             Chess pieces 4 min 4 min 4 min 4 min      4 min   flexbar towel twist    Red 30x         fingerweb digit flexion Green 30x Green 30x Green 30x Green 30x      Green 30x   Single clothespin- rubber handle Red 30x Green 30x Green 30x  Green 30x      Red 30x   digiball puppet hand and IP flexion Red 30x Red 30x Red 30x Red 30x      Green 30x   Putty press HEP             15 15 15 15      15   Ther Activity                                       Gait Training                                       Modalities             MH 10 10 10 10      10                CP

## 2021-09-30 ENCOUNTER — APPOINTMENT (OUTPATIENT)
Dept: PHYSICAL THERAPY | Facility: MEDICAL CENTER | Age: 31
End: 2021-09-30
Payer: COMMERCIAL

## 2021-10-05 ENCOUNTER — OFFICE VISIT (OUTPATIENT)
Dept: PHYSICAL THERAPY | Facility: MEDICAL CENTER | Age: 31
End: 2021-10-05
Payer: COMMERCIAL

## 2021-10-05 DIAGNOSIS — Z47.89 AFTERCARE FOLLOWING SURGERY OF THE MUSCULOSKELETAL SYSTEM, NEC: Primary | ICD-10-CM

## 2021-10-05 DIAGNOSIS — S61.512D LACERATION OF LEFT WRIST, SUBSEQUENT ENCOUNTER: ICD-10-CM

## 2021-10-05 PROCEDURE — 97140 MANUAL THERAPY 1/> REGIONS: CPT

## 2021-10-07 ENCOUNTER — APPOINTMENT (OUTPATIENT)
Dept: PHYSICAL THERAPY | Facility: MEDICAL CENTER | Age: 31
End: 2021-10-07
Payer: COMMERCIAL

## 2021-10-14 ENCOUNTER — APPOINTMENT (OUTPATIENT)
Dept: PHYSICAL THERAPY | Facility: MEDICAL CENTER | Age: 31
End: 2021-10-14
Payer: COMMERCIAL

## 2021-10-19 ENCOUNTER — OFFICE VISIT (OUTPATIENT)
Dept: PHYSICAL THERAPY | Facility: MEDICAL CENTER | Age: 31
End: 2021-10-19
Payer: COMMERCIAL

## 2021-10-19 DIAGNOSIS — S61.512D LACERATION OF LEFT WRIST, SUBSEQUENT ENCOUNTER: ICD-10-CM

## 2021-10-19 DIAGNOSIS — Z47.89 AFTERCARE FOLLOWING SURGERY OF THE MUSCULOSKELETAL SYSTEM, NEC: Primary | ICD-10-CM

## 2021-10-19 PROCEDURE — 97140 MANUAL THERAPY 1/> REGIONS: CPT

## 2021-10-26 ENCOUNTER — OFFICE VISIT (OUTPATIENT)
Dept: PHYSICAL THERAPY | Facility: MEDICAL CENTER | Age: 31
End: 2021-10-26
Payer: COMMERCIAL

## 2021-10-26 DIAGNOSIS — Z47.89 AFTERCARE FOLLOWING SURGERY OF THE MUSCULOSKELETAL SYSTEM, NEC: Primary | ICD-10-CM

## 2021-10-26 DIAGNOSIS — S61.512D LACERATION OF LEFT WRIST, SUBSEQUENT ENCOUNTER: ICD-10-CM

## 2021-10-26 PROCEDURE — 97140 MANUAL THERAPY 1/> REGIONS: CPT

## 2021-11-02 ENCOUNTER — OFFICE VISIT (OUTPATIENT)
Dept: PHYSICAL THERAPY | Facility: MEDICAL CENTER | Age: 31
End: 2021-11-02
Payer: COMMERCIAL

## 2021-11-02 DIAGNOSIS — S61.512D LACERATION OF LEFT WRIST, SUBSEQUENT ENCOUNTER: ICD-10-CM

## 2021-11-02 DIAGNOSIS — Z47.89 AFTERCARE FOLLOWING SURGERY OF THE MUSCULOSKELETAL SYSTEM, NEC: Primary | ICD-10-CM

## 2021-11-02 PROCEDURE — 97140 MANUAL THERAPY 1/> REGIONS: CPT

## 2021-11-09 ENCOUNTER — OFFICE VISIT (OUTPATIENT)
Dept: PHYSICAL THERAPY | Facility: MEDICAL CENTER | Age: 31
End: 2021-11-09
Payer: COMMERCIAL

## 2021-11-09 DIAGNOSIS — S61.512D LACERATION OF LEFT WRIST, SUBSEQUENT ENCOUNTER: Primary | ICD-10-CM

## 2021-11-09 DIAGNOSIS — Z47.89 AFTERCARE FOLLOWING SURGERY OF THE MUSCULOSKELETAL SYSTEM, NEC: ICD-10-CM

## 2021-11-09 PROCEDURE — 97110 THERAPEUTIC EXERCISES: CPT

## 2021-11-09 PROCEDURE — 97010 HOT OR COLD PACKS THERAPY: CPT

## 2021-11-09 PROCEDURE — 97140 MANUAL THERAPY 1/> REGIONS: CPT

## 2021-11-16 ENCOUNTER — OFFICE VISIT (OUTPATIENT)
Dept: PHYSICAL THERAPY | Facility: MEDICAL CENTER | Age: 31
End: 2021-11-16
Payer: COMMERCIAL

## 2021-11-16 DIAGNOSIS — S61.512D LACERATION OF LEFT WRIST, SUBSEQUENT ENCOUNTER: Primary | ICD-10-CM

## 2021-11-16 DIAGNOSIS — Z47.89 AFTERCARE FOLLOWING SURGERY OF THE MUSCULOSKELETAL SYSTEM, NEC: ICD-10-CM

## 2021-11-16 PROCEDURE — 97110 THERAPEUTIC EXERCISES: CPT

## 2021-11-16 PROCEDURE — 97140 MANUAL THERAPY 1/> REGIONS: CPT

## 2021-11-16 PROCEDURE — 97010 HOT OR COLD PACKS THERAPY: CPT

## 2021-11-23 ENCOUNTER — OFFICE VISIT (OUTPATIENT)
Dept: PHYSICAL THERAPY | Facility: MEDICAL CENTER | Age: 31
End: 2021-11-23
Payer: COMMERCIAL

## 2021-11-23 DIAGNOSIS — Z47.89 AFTERCARE FOLLOWING SURGERY OF THE MUSCULOSKELETAL SYSTEM, NEC: ICD-10-CM

## 2021-11-23 DIAGNOSIS — S61.512D LACERATION OF LEFT WRIST, SUBSEQUENT ENCOUNTER: Primary | ICD-10-CM

## 2021-11-23 PROCEDURE — 97110 THERAPEUTIC EXERCISES: CPT

## 2021-11-23 PROCEDURE — 97140 MANUAL THERAPY 1/> REGIONS: CPT

## 2021-11-30 ENCOUNTER — OFFICE VISIT (OUTPATIENT)
Dept: PHYSICAL THERAPY | Facility: MEDICAL CENTER | Age: 31
End: 2021-11-30
Payer: COMMERCIAL

## 2021-11-30 DIAGNOSIS — Z47.89 AFTERCARE FOLLOWING SURGERY OF THE MUSCULOSKELETAL SYSTEM, NEC: ICD-10-CM

## 2021-11-30 DIAGNOSIS — S61.512D LACERATION OF LEFT WRIST, SUBSEQUENT ENCOUNTER: Primary | ICD-10-CM

## 2021-11-30 PROCEDURE — 97110 THERAPEUTIC EXERCISES: CPT

## 2021-11-30 PROCEDURE — 97010 HOT OR COLD PACKS THERAPY: CPT

## 2021-11-30 PROCEDURE — 97112 NEUROMUSCULAR REEDUCATION: CPT

## 2021-11-30 PROCEDURE — 97140 MANUAL THERAPY 1/> REGIONS: CPT

## 2021-12-09 ENCOUNTER — OFFICE VISIT (OUTPATIENT)
Dept: PHYSICAL THERAPY | Facility: MEDICAL CENTER | Age: 31
End: 2021-12-09
Payer: COMMERCIAL

## 2021-12-09 DIAGNOSIS — Z47.89 AFTERCARE FOLLOWING SURGERY OF THE MUSCULOSKELETAL SYSTEM, NEC: ICD-10-CM

## 2021-12-09 DIAGNOSIS — S61.512D LACERATION OF LEFT WRIST, SUBSEQUENT ENCOUNTER: Primary | ICD-10-CM

## 2021-12-09 PROCEDURE — 97140 MANUAL THERAPY 1/> REGIONS: CPT

## 2021-12-21 ENCOUNTER — OFFICE VISIT (OUTPATIENT)
Dept: PHYSICAL THERAPY | Facility: MEDICAL CENTER | Age: 31
End: 2021-12-21
Payer: COMMERCIAL

## 2021-12-21 DIAGNOSIS — S61.512D LACERATION OF LEFT WRIST, SUBSEQUENT ENCOUNTER: Primary | ICD-10-CM

## 2021-12-21 DIAGNOSIS — Z47.89 AFTERCARE FOLLOWING SURGERY OF THE MUSCULOSKELETAL SYSTEM, NEC: ICD-10-CM

## 2021-12-21 PROCEDURE — 97140 MANUAL THERAPY 1/> REGIONS: CPT
